# Patient Record
Sex: FEMALE | Race: WHITE | NOT HISPANIC OR LATINO | Employment: PART TIME | URBAN - METROPOLITAN AREA
[De-identification: names, ages, dates, MRNs, and addresses within clinical notes are randomized per-mention and may not be internally consistent; named-entity substitution may affect disease eponyms.]

---

## 2017-01-04 ENCOUNTER — TELEPHONE (OUTPATIENT)
Dept: NEUROSURGERY | Facility: CLINIC | Age: 52
End: 2017-01-04

## 2017-01-04 NOTE — TELEPHONE ENCOUNTER
Provider:  Eleazar  Caller:     Phone #:    Surgery:PLIF L4-5    Surgery Date: 12/29/16   Last visit:   11/23/16    BRITTNI:         Reason for call:       Pt left message stating that she is now experiencing numbness in her left leg since the surgery, was concerned about this, requests call back

## 2017-01-05 DIAGNOSIS — Z98.1 STATUS POST LUMBAR SPINAL FUSION: Primary | ICD-10-CM

## 2017-01-19 ENCOUNTER — HOSPITAL ENCOUNTER (OUTPATIENT)
Dept: GENERAL RADIOLOGY | Facility: HOSPITAL | Age: 52
Discharge: HOME | End: 2017-01-19
Admitting: PHYSICIAN ASSISTANT

## 2017-01-19 DIAGNOSIS — Z98.1 STATUS POST LUMBAR SPINAL FUSION: ICD-10-CM

## 2017-01-19 PROCEDURE — 72100 X-RAY EXAM L-S SPINE 2/3 VWS: CPT

## 2017-01-20 ENCOUNTER — OFFICE VISIT (OUTPATIENT)
Dept: NEUROSURGERY | Facility: CLINIC | Age: 52
End: 2017-01-20

## 2017-01-20 VITALS — WEIGHT: 158 LBS | BODY MASS INDEX: 26.33 KG/M2 | TEMPERATURE: 98.4 F | HEIGHT: 65 IN

## 2017-01-20 DIAGNOSIS — M53.2X6 SPINAL INSTABILITY OF LUMBAR REGION: Primary | ICD-10-CM

## 2017-01-20 DIAGNOSIS — Z98.1 STATUS POST LUMBAR SPINAL FUSION: ICD-10-CM

## 2017-01-20 DIAGNOSIS — M43.10 ACQUIRED SPONDYLOLISTHESIS: ICD-10-CM

## 2017-01-20 PROCEDURE — 99024 POSTOP FOLLOW-UP VISIT: CPT | Performed by: PHYSICIAN ASSISTANT

## 2017-01-20 RX ORDER — ESTRADIOL 0.05 MG/D
FILM, EXTENDED RELEASE TRANSDERMAL
Qty: 24 PATCH | Refills: 0 | Status: SHIPPED | OUTPATIENT
Start: 2017-01-20 | End: 2018-01-31 | Stop reason: SINTOL

## 2017-01-20 NOTE — TELEPHONE ENCOUNTER
Dr. George pt  Patient's pharmacy [facility] faxed over a request for 90 day supply on Estradiol patch 0.05mg. E-Rx #24 no refills on 1/20/2017

## 2017-01-20 NOTE — LETTER
"2017     Ana Johnston MD  [address] [address]    Patient: Echo Lane   YOB: 1965   Date of Visit: 2017       Dear Dr. Preston MD:    Echo Lane was in my office today. Below is a copy of my note.    If you have questions, please do not hesitate to call me. I look forward to following Echo along with you.         Sincerely,        Cherie Shaffer PA-C        CC: No Recipients    Patient: Echo Lane  : 1965  Chart #: [id number]    Date of Service: 2017    CHIEF COMPLAINT: L4 5 transition syndrome with stenosis, spondylolisthesis and instability    History of Present Illness Ms. Lane is a 51-year-old  who underwent with at L5-S1 level in  and did well.  More recently she developed progressive back pain.  Studies revealed new onset of L4 and L5 with scott movement noted on flexion-extension views.  There is also some stenosis.  As such on 2016 she underwent L4 5 fusion extension with posterior lumbar interbody fusion.  Surgery was without overt intraoperative complication.    Today Ms. Lane is 3 weeks postop.  Her back pain has improved dramatically.  She has no leg pain.  She does have a bit of numbness in the left shin.  She has been walking 20 minutes a couple times a day and would like to increase her activities.  She would also like to return to work.  She denies incisional difficulties.  She is no longer taking narcotic-based pain medication.  She takes ibuprofen on occasion.      The following portions of the patient's history were reviewed and updated as appropriate: allergies, current medications, past family history, past medical history, past social history, past surgical history and problem list.    Review of Systems   All other systems reviewed and are negative.      Objective   Vital Signs: Temperature 98.4 °F (36.9 °C), height 65\" (165.1 cm), weight 158 lb " (71.7 kg).  Physical Exam   Constitutional: She appears well-developed and well-nourished. No distress.   HENT:   Head: Normocephalic and atraumatic.   Eyes: EOM are normal. Pupils are equal, round, and reactive to light.   Skin:   Lumbar incision has healed nicely without warmth erythema or induration.   Psychiatric: She has a normal mood and affect. Her behavior is normal. Thought content normal.   Nursing note and vitals reviewed.    Radiographic Imaging: plain films of the lumbar spine were reviewed and reveal good placement of the surgical construct.    Assessment/Plan   Medical Decision Making: Ms. Lane is 3 weeks status post L4 5 fusion extension with PLIF and is doing excellent.  Her incision is healing nicely.  She may return to tub bathing. We discussed some specific exercises she may begin to resume on a graded basis. She will be cognizant of her limitations.   I advised her to avoid high impact workouts for several more weeks.  She may return to work next week at her request. She can do a few half days the first week before returning full-time. I encouraged frequent changes in position.  She'll follow up in 6-8 weeks with Dr. Bush.                Cherie Shaffer PA-C  Patient Care Team:  Ana Johnston MD as PCP - General (Family Medicine)  Ana Johnston MD as Referring Physician (Family Medicine)

## 2017-01-20 NOTE — PROGRESS NOTES
"Patient: Echo Lane  : 1965  Chart #: [id number]    Date of Service: 2017    CHIEF COMPLAINT: L4 5 transition syndrome with stenosis, spondylolisthesis and instability    History of Present Illness Ms. Lane is a 51-year-old  who underwent with at L5-S1 level in  and did well.  More recently she developed progressive back pain.  Studies revealed new onset of L4 and L5 with scott movement noted on flexion-extension views.  There is also some stenosis.  As such on 2016 she underwent L4 5 fusion extension with posterior lumbar interbody fusion.  Surgery was without overt intraoperative complication.    Today Ms. Lane is 3 weeks postop.  Her back pain has improved dramatically.  She has no leg pain.  She does have a bit of numbness in the left shin.  She has been walking 20 minutes a couple times a day and would like to increase her activities.  She would also like to return to work.  She denies incisional difficulties.  She is no longer taking narcotic-based pain medication.  She takes ibuprofen on occasion.      The following portions of the patient's history were reviewed and updated as appropriate: allergies, current medications, past family history, past medical history, past social history, past surgical history and problem list.    Review of Systems   All other systems reviewed and are negative.      Objective   Vital Signs: Temperature 98.4 °F (36.9 °C), height 65\" (165.1 cm), weight 158 lb (71.7 kg).  Physical Exam   Constitutional: She appears well-developed and well-nourished. No distress.   HENT:   Head: Normocephalic and atraumatic.   Eyes: EOM are normal. Pupils are equal, round, and reactive to light.   Skin:   Lumbar incision has healed nicely without warmth erythema or induration.   Psychiatric: She has a normal mood and affect. Her behavior is normal. Thought content normal.   Nursing note and vitals reviewed.    Radiographic Imaging: plain " films of the lumbar spine were reviewed and reveal good placement of the surgical construct.    Assessment/Plan   Medical Decision Making: Ms. Lane is 3 weeks status post L4 5 fusion extension with PLIF and is doing excellent.  Her incision is healing nicely.  She may return to tub bathing. We discussed some specific exercises she may begin to resume on a graded basis. She will be cognizant of her limitations.   I advised her to avoid high impact workouts for several more weeks.  She may return to work next week at her request. She can do a few half days the first week before returning full-time. I encouraged frequent changes in position.  She'll follow up in 6-8 weeks with Dr. Bush.                Cherie Shaffer PA-C  Patient Care Team:  Ana Johnston MD as PCP - General (Family Medicine)  Ana Johnston MD as Referring Physician (Family Medicine)

## 2017-01-20 NOTE — MR AVS SNAPSHOT
Echo Lane   1/20/2017 1:30 PM   Office Visit    Dept Phone:  [phone]   Encounter #:  [id number]    Provider:  Cherie Shaffer PA-C   Department:  [facility]                Your Full Care Plan              Today's Medication Changes          These changes are accurate as of: 1/20/17  2:04 PM.  If you have any questions, ask your nurse or doctor.               Stop taking medication(s)listed here:     oxyCODONE ER 15 MG tablet extended-release 12 hour   Commonly known as:  OXYCONTIN   Stopped by:  Cherie Shaffer PA-C                Where to Get Your Medications      These medications were sent to [facility] - [place], [place] - [address] [place] [address] - [phone]  - [phone] [address] [address]     Phone:  [phone]     estradiol 0.05 MG/24HR patch                  Your Updated Medication List          This list is accurate as of: 1/20/17  2:04 PM.  Always use your most recent med list.                ARMOUR THYROID 30 MG tablet   Generic drug:  Thyroid       atorvastatin 10 MG tablet   Commonly known as:  LIPITOR       estradiol 0.05 MG/24HR patch   Commonly known as:  MINIVELLE   Apply 1 minivelle patch by transdermal route twice weekly       ibuprofen 600 MG tablet   Commonly known as:  ADVIL,MOTRIN   Take 1 tablet by mouth 3 (Three) Times a Day.       progesterone 100 MG capsule   Commonly known as:  PROMETRIUM   Take 1 capsule by mouth every night.               You Were Diagnosed With        Codes Comments    Spinal instability of lumbar region    -  Primary ICD-10-CM: M53.2X6  ICD-9-CM: 724.9     Status post lumbar spinal fusion     ICD-10-CM: Z98.1  ICD-9-CM: V45.4     Acquired spondylolisthesis     ICD-10-CM: M43.10  ICD-9-CM: 738.4       Instructions     None    Patient Instructions History      Upcoming Appointments     Visit Type Date Time Department    POST-OP  "1/20/2017  1:30 PM [facility]    OFFICE VISIT 3/8/2017  9:15 AM [facility]      MyChart Signup     Our records indicate that you have an active [facility] [employer] account.    You can view your After Visit Summary by going to [employer] and logging in with your [employer] username and password.  If you don't have a [employer] username and password but a parent or guardian has access to your record, the parent or guardian should login with their own [employer] username and password and access your record to view the After Visit Summary.    If you have questions, you can email [email or web address] or call [phone] to talk to our [employer] staff.  Remember, [employer] is NOT to be used for urgent needs.  For medical emergencies, dial 911.               Other Info from Your Visit           Your Appointments     Mar 08, 2017  9:15 AM EST   (Arrive by 9:00 AM EST)   Office Visit with Salvador Bush MD   [facility] (--)    [address],  [address] [address] [phone]           Arrive 15 minutes prior to appointment.              Allergies     Bactrim [Sulfamethoxazole-trimethoprim] Allergy Hives      Reason for Visit     Post-op           Vital Signs     Temperature Height Weight Last Menstrual Period Body Mass Index Smoking Status    98.4 °F (36.9 °C) 65\" (165.1 cm) 158 lb (71.7 kg) (LMP Unknown) 26.29 kg/m2 Never Smoker      Problems and Diagnoses Noted     Lumbar spine instability    -  Primary    Status post lumbar spinal fusion        Disorder of spine            "

## 2017-01-23 ENCOUNTER — TELEPHONE (OUTPATIENT)
Dept: NEUROSURGERY | Facility: CLINIC | Age: 52
End: 2017-01-23

## 2017-01-23 DIAGNOSIS — M53.2X6 LUMBAR SPINE INSTABILITY: Primary | ICD-10-CM

## 2017-01-23 NOTE — TELEPHONE ENCOUNTER
Dr. Bush  Sx: 12/29/16 PLIF L4-5 L5-S1  Last seen: 01/20/17  F/U: 03/08/17    Pt sent email requesting info on restrictions and wanting to know if he will follow up with xrays.

## 2017-01-23 NOTE — TELEPHONE ENCOUNTER
----- Message from Echo Lane sent at 1/20/2017 10:21 PM EST -----  Regarding: Visit Follow-Up Question  Contact: [phone]  I've thought if a couple more questions. I was told in the hospital that I can only lift 10 pounds. Is that restriction still the same?  Also, will I need to get an X-ray before my next appointment with Dr. Bush?    Thanks so much!    Echo Lane

## 2017-01-23 NOTE — TELEPHONE ENCOUNTER
Called pt, adv of restrictions. She asked me to mail the PT order to her, verified address. Pt understood and had no further questions.

## 2017-01-23 NOTE — TELEPHONE ENCOUNTER
Patient needs to attend PT. There they will be able to assess her abilities.     From our standpoint no heavy lifting and avoid BLTs.     No additional Xrays needed.

## 2017-02-01 ENCOUNTER — HOSPITAL ENCOUNTER (OUTPATIENT)
Dept: PHYSICAL THERAPY | Facility: HOSPITAL | Age: 52
Setting detail: THERAPIES SERIES
Discharge: HOME | End: 2017-02-01

## 2017-02-01 DIAGNOSIS — M54.50 ACUTE BILATERAL LOW BACK PAIN WITHOUT SCIATICA: Primary | ICD-10-CM

## 2017-02-01 DIAGNOSIS — M53.2X6 LUMBAR SPINE INSTABILITY: ICD-10-CM

## 2017-02-01 PROCEDURE — 97161 PT EVAL LOW COMPLEX 20 MIN: CPT

## 2017-02-01 NOTE — PROGRESS NOTES
Outpatient Physical Therapy Ortho Initial Evaluation   [place]     Patient Name: Echo Lane  : 1965  MRN: [id number]  Today's Date: 2017      Visit Date: 2017    Patient Active Problem List   Diagnosis   • Lumbar spine instability        Past Medical History   Diagnosis Date   • Arthritis    • Back pain    • Disease of thyroid gland    • Eczema    • Hyperlipidemia    • Wears glasses         Past Surgical History   Procedure Laterality Date   • Reduction mammaplasty Bilateral 2004   • Lumbar fusion  2012     PLIF L5-S1 (Eleazar)   • Tonsillectomy     • Lumbar laminectomy with fusion N/A 2016     Procedure: LUMBAR LAMINECTOMY POSTERIOR LUMBAR INTERBODY FUSION L4-5;  Surgeon: Salvador Bush MD;  Location: [facility];  Service:        Visit Dx:     ICD-10-CM ICD-9-CM   1. Acute bilateral low back pain without sciatica M54.5 724.2     338.19             Patient History       17 0800          History    Chief Complaint Pain;Muscle tenderness;Joint stiffness;Difficulty Walking  -GB      Type of Pain Back pain  -GB      Date Current Problem(s) Began 16   pre-op 1-2 years  -GB      Brief Description of Current Complaint Patient has experienced stiffness in her lower back and bilateral hips and lower back.  She experiences problems with walking.  The pain she had from her original condition seems to be gone.  -GB      Patient/Caregiver Goals Return to prior level of function  -GB      Patient's Rating of General Health Excellent  -GB      Occupation/sports/leisure activities Patient is employed as an  and she has retruned to work.  She enjoys walking, and boating.  -GB      Pain     Pain Location Back  -GB      Pain at Present 2  -GB      Pain at Best 0  -GB      Pain at Worst 3  -GB      Fall Risk Assessment    Any falls in the past year: Yes  -GB      Number of falls reported in the last 12 months 1  -GB      Factors that contributed to the fall: Tripped   -GB      Daily Activities    Primary Language English  -GB      Pt Participated in POC and Goals Yes  -GB      Safety    Are you being hurt, hit, or frightened by anyone at home or in your life? No  -GB        User Key  (r) = Recorded By, (t) = Taken By, (c) = Cosigned By    Initials Name Provider Type    GB Deonte Keenan, PT Physical Therapist                PT Ortho       02/01/17 1400    Posture/Observations    Posture/Observations Comments Observed tightness of bilateral lumbar paraspinals;   -GB    Sensory Screen for Light Touch- Lower Quarter Clearing    L2 (anterior mid thigh) Bilateral:;Intact  -GB    L3 (distal anterior thigh) Bilateral:;Intact  -GB    L4 (medial lower leg/foot) Bilateral:;Intact  -GB    L5 (lateral lower leg/great toe) Right:;Intact;Left:;Diminished   At left lateral leg, symmetrical and intact at big toe  -GB    Myotomal Screen- Lower Quarter Clearing    Hip flexion (L2) Bilateral:;4+ (Good +)  -GB    Knee extension (L3) Bilateral:;5 (Normal)  -GB    Ankle DF (L4) Bilateral:;4 (Good)  -GB    Great toe extension (L5) Bilateral:;4+ (Good +)  -GB    Ankle PF (S1) Bilateral:;5 (Normal)  -GB    Knee flexion (S2) Bilateral:;5 (Normal)  -GB    Lumbar ROM Screen- Lower Quarter Clearing    Lumbar Flexion Impaired   50  -GB    Lumbar Extension Impaired   10  -GB    Lumbar Lateral Flexion Normal  -GB    Lumbar Rotation Normal   Patient is hesitant with movement  -GB    SI/Hip Screen- Lower Quarter Clearing    ASIS compression Bilateral:;Negative  -GB    ASIS distraction Bilateral:;Negative  -GB    Dior's/Rebel's test Bilateral:;Positive  -GB    Special Tests/Palpation    Special Tests/Palpation Lumbar/SI  -GB    Lumbosacral Palpation    SI Bilateral:;Tender  -GB    Lumbosacral Segment Left:;Guarded/taut   Over L4,L5  -GB    Erector Spinae (Paraspinals) Left:;Guarded/taut;Tender  -GB    Hip/Thigh Palpation    Greater Trochanter Right:;Tender  -GB    Leg Length Test    Apparent Unequal;Left  Higher Leg  -GB    ROM (Range of Motion)    General ROM Detail LEs are WFL  -GB    MMT (Manual Muscle Testing)    General MMT Assessment Detail Hip ABDuction = 4/5, and Adduction= 4/5, (B)  -GB      User Key  (r) = Recorded By, (t) = Taken By, (c) = Cosigned By    Initials Name Provider Type    AGATA Keenan, PT Physical Therapist                            Therapy Education       02/01/17 1416    Therapy Education    Given Symptoms/condition management   Lumbar education with walking program discussed to <10 min keeping pain free or OK to use elliptical in pain free time frame, limited lifitng to 10% body weight  or 15 lbs  -GB    How Provided Verbal  -GB    Provided to Patient  -GB    Level of Understanding Verbalized  -GB      User Key  (r) = Recorded By, (t) = Taken By, (c) = Cosigned By    Initials Name Provider Type    AGATA Keenan, PT Physical Therapist                PT OP Goals       02/01/17 1400          PT Short Term Goals    STG Date to Achieve 02/15/17  -GB      STG 1 Patient is independent with a HEP for flexibility and initial strengthening.  -GB      STG 1 Progress New  -GB      STG 2 Patient is able to tolerate walking or activity for 20 min without increased pain.  -GB      STG 2 Progress New  -GB      Long Term Goals    LTG Date to Achieve 03/03/17  -GB      LTG 1 Patinet is independent with HEP for stabilization and self mobilization.  -GB      LTG 1 Progress New  -GB      LTG 2 Patient is able to tolerate moderate exercise or acitivty for over 30 min without complaints.  -GB      LTG 2 Progress New  -GB      LTG 3 Modified Oswestry score is improved by at least 10%.  -GB      LTG 3 Progress New  -GB      Time Calculation    PT Goal Re-Cert Due Date 03/03/17  -GB        User Key  (r) = Recorded By, (t) = Taken By, (c) = Cosigned By    Initials Name Provider Type    AGATA Keenan PT Physical Therapist                PT Assessment/Plan       02/01/17 1420          PT  Assessment    Functional Limitations Performance in leisure activities;Limitations in functional capacity and performance;Limitation in home management;Limitations in community activities;Performance in work activities;Performance in self-care ADL  -GB      Impairments Endurance;Range of motion;Posture;Poor body mechanics;Pain;Sensation;Muscle strength;Joint mobility  -GB      Assessment Comments Patient has findings consistent with this stage of her recovery.  She is tight in left lumbar areas and bilateral Gluteal areas, left being slightly worse than right.  She should benefit from therapy with initial emphasis on ther ex and manual techniques.  -GB      Please refer to paper survey for additional self-reported information Yes  -GB      Rehab Potential Good  -GB      Patient/caregiver participated in establishment of treatment plan and goals Yes  -GB      Patient would benefit from skilled therapy intervention Yes  -GB      PT Plan    PT Frequency 1x/week  -GB      Predicted Duration of Therapy Intervention (days/wks) 4 weeks  -GB      Planned CPT's? PT THER PROC EA 15 MIN: 15177;PT MANUAL THERAPY EA 15 MIN: 32878;PT NEUROMUSC RE-EDUCATION EA 15 MIN: 88263;PT ELECTRICAL STIM UNATTEND: ;PT HOT/COLD PACK WC NONMCARE: 19394   93115  -GB      PT Plan Comments Progress with ther ex in clinical setting and with HEP.  Incorporate manual therapy or Astym techniques.  -AGATA        User Key  (r) = Recorded By, (t) = Taken By, (c) = Cosigned By    Initials Name Provider Type    AGATA Keenan, MAURICE Physical Therapist                                    Outcome Measures       02/01/17 1400          Modified Oswestry    Modified Oswestry Score/Comments 20%  -GB      Functional Assessment    Outcome Measure Options Modifed Owestry  -AGATA        User Key  (r) = Recorded By, (t) = Taken By, (c) = Cosigned By    Initials Name Provider Type    AGATA Keenan, MAURICE Physical Therapist            Time Calculation:   Start  Time: 0830     Therapy Charges for Today     Code Description Service Date Service Provider Modifiers Qty    02420746320 HC PT EVAL LOW COMPLEXITY 4 2/1/2017 Deonte Keenan, PT GP 1          PT G-Codes  Outcome Measure Options: Modifed José Miguel Keenan, PT  2/1/2017

## 2017-02-02 ENCOUNTER — APPOINTMENT (OUTPATIENT)
Dept: PHYSICAL THERAPY | Facility: HOSPITAL | Age: 52
End: 2017-02-02

## 2017-02-09 ENCOUNTER — HOSPITAL ENCOUNTER (OUTPATIENT)
Dept: PHYSICAL THERAPY | Facility: HOSPITAL | Age: 52
Setting detail: THERAPIES SERIES
Discharge: HOME | End: 2017-02-09

## 2017-02-09 DIAGNOSIS — M54.50 ACUTE BILATERAL LOW BACK PAIN WITHOUT SCIATICA: Primary | ICD-10-CM

## 2017-02-09 PROCEDURE — 97140 MANUAL THERAPY 1/> REGIONS: CPT

## 2017-02-09 PROCEDURE — 97110 THERAPEUTIC EXERCISES: CPT

## 2017-02-09 NOTE — PROGRESS NOTES
Outpatient Physical Therapy Ortho Treatment Note  [facility]     Patient Name: Echo Lane  : 1965  MRN: [id number]  Today's Date: 2017      Visit Date: 2017    Visit Dx:    ICD-10-CM ICD-9-CM   1. Acute bilateral low back pain without sciatica M54.5 724.2     338.19       Patient Active Problem List   Diagnosis   • Lumbar spine instability        Past Medical History   Diagnosis Date   • Arthritis    • Back pain    • Disease of thyroid gland    • Eczema    • Hyperlipidemia    • Wears glasses         Past Surgical History   Procedure Laterality Date   • Reduction mammaplasty Bilateral    • Lumbar fusion  2012     PLIF L5-S1 (Eleazar)   • Tonsillectomy     • Lumbar laminectomy with fusion N/A 2016     Procedure: LUMBAR LAMINECTOMY POSTERIOR LUMBAR INTERBODY FUSION L4-5;  Surgeon: Salvador Bush MD;  Location: [facility];  Service:                              PT Assessment/Plan       17 1539          PT Assessment    Assessment Comments Patient has significant tightness in lower back, left Glut Medius and left SI.  -GB      PT Plan    PT Plan Comments Follow up with care.  -GB        User Key  (r) = Recorded By, (t) = Taken By, (c) = Cosigned By    Initials Name Provider Type    AGATA Keenan, PT Physical Therapist                    Exercises       17 1100          Subjective Comments    Subjective Comments Patient is without any new complaints.  -GB      Subjective Pain    Able to rate subjective pain? yes  -GB      Pre-Treatment Pain Level 3  -GB      Post-Treatment Pain Level 3  -GB      Exercise 1    Exercise Name 1 Ther ex in clinical setting as per flow sheet  -GB      Time (Minutes) 1 18  -GB      Treatment Type 1 Ther Ex  -GB        User Key  (r) = Recorded By, (t) = Taken By, (c) = Cosigned By    Initials Name Provider Type    AGATA Keenan, PT Physical Therapist                        Manual Rx (last 36 hours)      Manual  Treatments       02/09/17 1500          Manual Rx 1    Manual Rx 1 Location (B) Lumbosacral to Thoracic paraspinals and (B) Gluteal areas  -GB      Manual Rx 1 Type Astym and light STM to paraspinals  -GB      Manual Rx 1 Duration 12  -GB        User Key  (r) = Recorded By, (t) = Taken By, (c) = Cosigned By    Initials Name Provider Type    AGATA Keenan, PT Physical Therapist                PT OP Goals       02/01/17 1400          PT Short Term Goals    STG Date to Achieve 02/15/17  -GB      STG 1 Patient is independent with a HEP for flexibility and initial strengthening.  -GB      STG 1 Progress New  -GB      STG 2 Patient is able to tolerate walking or activity for 20 min without increased pain.  -GB      STG 2 Progress New  -GB      Long Term Goals    LTG Date to Achieve 03/03/17  -GB      LTG 1 Patinet is independent with HEP for stabilization and self mobilization.  -GB      LTG 1 Progress New  -GB      LTG 2 Patient is able to tolerate moderate exercise or acitivty for over 30 min without complaints.  -GB      LTG 2 Progress New  -GB      LTG 3 Modified Oswestry score is improved by at least 10%.  -GB      LTG 3 Progress New  -GB      Time Calculation    PT Goal Re-Cert Due Date 03/03/17  -GB        User Key  (r) = Recorded By, (t) = Taken By, (c) = Cosigned By    Initials Name Provider Type    AGATA Keenan, MAURICE Physical Therapist                Therapy Education       02/09/17 1534    Therapy Education    Given HEP   SKTC, short arc wall squats, hip adduction squeezes, Hip ABDuction SLR, ab presses and leaning planks  -GB    Program Progressed  -GB    How Provided Written;Demonstration;Verbal  -GB    Provided to Patient  -GB    Level of Understanding Teach back education performed;Verbalized;Demonstrated  -GB      User Key  (r) = Recorded By, (t) = Taken By, (c) = Cosigned By    Initials Name Provider Type    AGATA Keenan PT Physical Therapist                Time Calculation:    Start Time: 1133  Total Timed Code Minutes- PT: 35 minute(s)    Therapy Charges for Today     Code Description Service Date Service Provider Modifiers Qty    56770138647 HC PT THER PROC EA 15 MIN 2/9/2017 Deonte Keenan, PT GP 1    76629253813 HC PT MANUAL THERAPY EA 15 MIN 2/9/2017 Deonte Keenan, PT GP 1                    Deonte Keenan, PT  2/9/2017

## 2017-02-15 ENCOUNTER — APPOINTMENT (OUTPATIENT)
Dept: PHYSICAL THERAPY | Facility: HOSPITAL | Age: 52
End: 2017-02-15

## 2017-02-16 ENCOUNTER — APPOINTMENT (OUTPATIENT)
Dept: PHYSICAL THERAPY | Facility: HOSPITAL | Age: 52
End: 2017-02-16

## 2017-02-17 ENCOUNTER — HOSPITAL ENCOUNTER (OUTPATIENT)
Dept: PHYSICAL THERAPY | Facility: HOSPITAL | Age: 52
Setting detail: THERAPIES SERIES
Discharge: HOME | End: 2017-02-17

## 2017-02-17 DIAGNOSIS — M54.50 ACUTE BILATERAL LOW BACK PAIN WITHOUT SCIATICA: Primary | ICD-10-CM

## 2017-02-17 PROCEDURE — 97110 THERAPEUTIC EXERCISES: CPT

## 2017-02-17 NOTE — PROGRESS NOTES
Outpatient Physical Therapy Ortho Treatment Note  [facility]     Patient Name: Echo Lane  : 1965  MRN: [id number]  Today's Date: 2017      Visit Date: 2017    Visit Dx:    ICD-10-CM ICD-9-CM   1. Acute bilateral low back pain without sciatica M54.5 724.2     338.19       Patient Active Problem List   Diagnosis   • Lumbar spine instability        Past Medical History   Diagnosis Date   • Arthritis    • Back pain    • Disease of thyroid gland    • Eczema    • Hyperlipidemia    • Wears glasses         Past Surgical History   Procedure Laterality Date   • Reduction mammaplasty Bilateral    • Lumbar fusion  2012     PLIF L5-S1 (Eleazar)   • Tonsillectomy     • Lumbar laminectomy with fusion N/A 2016     Procedure: LUMBAR LAMINECTOMY POSTERIOR LUMBAR INTERBODY FUSION L4-5;  Surgeon: Salvador Bush MD;  Location: [facility];  Service:                              PT Assessment/Plan       17 1154          PT Assessment    Assessment Comments Patient defers manual techniques today and does well with subtle progressions of HEP.  -GB      PT Plan    PT Plan Comments Progress with ther ex.  -GB        User Key  (r) = Recorded By, (t) = Taken By, (c) = Cosigned By    Initials Name Provider Type     Deonte Keenan, PT Physical Therapist                    Exercises       17 1100          Subjective Comments    Subjective Comments Patient continues to have mid back and hamstring tightness, but she wants to focus solely on exercises today.  -GB      Subjective Pain    Able to rate subjective pain? yes  -GB      Pre-Treatment Pain Level 2  -GB      Post-Treatment Pain Level 1  -GB      Exercise 1    Exercise Name 1 Nustep x 7 min at level 6;  Pilates based exercises:  100, ab presses, oblique presses, LTRs, side hip plank, modified plank, roll outs, bird dogs and cat/camel followed by stretches with sarabjit pose, hasmtring and Piriformis stretches  -GB      Time  (Minutes) 1 30  -GB      Treatment Type 1 Ther Ex  -GB        User Key  (r) = Recorded By, (t) = Taken By, (c) = Cosigned By    Initials Name Provider Type    AGATA Keenan PT Physical Therapist                        Manual Rx (last 36 hours)      Manual Treatments       02/17/17 1100          Manual Rx 1    Manual Rx 1 Location Gluteal to LEs  -GB      Manual Rx 1 Type Foam roller  -GB      Manual Rx 1 Duration 2-3  -GB        User Key  (r) = Recorded By, (t) = Taken By, (c) = Cosigned By    Initials Name Provider Type    AGATA Keenan, PT Physical Therapist                PT OP Goals       02/17/17 1100          PT Short Term Goals    STG 1 Patient is independent with a HEP for flexibility and initial strengthening.  -GB      STG 1 Progress Met  -GB        User Key  (r) = Recorded By, (t) = Taken By, (c) = Cosigned By    Initials Name Provider Type    AGATA Keenan PT Physical Therapist                Therapy Education       02/17/17 1153    Therapy Education    Given Symptoms/condition management   use of roller and change in frequency of exercises  -GB    Program Progressed   ab presses, 100, oblique presses, hip rolls, side hip raises, roll outs, leaning planks, bird dogs and cat/camel  -GB    How Provided Verbal;Demonstration;Written  -GB    Provided to Patient  -GB    Level of Understanding Teach back education performed;Verbalized;Demonstrated  -GB      User Key  (r) = Recorded By, (t) = Taken By, (c) = Cosigned By    Initials Name Provider Type    AGATA Keenan PT Physical Therapist                Time Calculation:   Start Time: 1100  Total Timed Code Minutes- PT: 35 minute(s)    Therapy Charges for Today     Code Description Service Date Service Provider Modifiers Qty    48368973312  PT THER PROC EA 15 MIN 2/17/2017 Deonte Keenan, PT GP 2                    Deonte Keenan PT  2/17/2017

## 2017-02-23 ENCOUNTER — APPOINTMENT (OUTPATIENT)
Dept: PHYSICAL THERAPY | Facility: HOSPITAL | Age: 52
End: 2017-02-23

## 2017-02-24 ENCOUNTER — HOSPITAL ENCOUNTER (OUTPATIENT)
Dept: PHYSICAL THERAPY | Facility: HOSPITAL | Age: 52
Setting detail: THERAPIES SERIES
Discharge: HOME | End: 2017-02-24

## 2017-02-24 DIAGNOSIS — M54.50 ACUTE BILATERAL LOW BACK PAIN WITHOUT SCIATICA: Primary | ICD-10-CM

## 2017-02-24 PROCEDURE — 97110 THERAPEUTIC EXERCISES: CPT

## 2017-02-24 PROCEDURE — 97140 MANUAL THERAPY 1/> REGIONS: CPT

## 2017-02-24 NOTE — PROGRESS NOTES
Outpatient Physical Therapy Ortho Treatment Note  [facility]     Patient Name: Echo Lane  : 1965  MRN: [id number]  Today's Date: 2017      Visit Date: 2017    Visit Dx:    ICD-10-CM ICD-9-CM   1. Acute bilateral low back pain without sciatica M54.5 724.2     338.19       Patient Active Problem List   Diagnosis   • Lumbar spine instability        Past Medical History   Diagnosis Date   • Arthritis    • Back pain    • Disease of thyroid gland    • Eczema    • Hyperlipidemia    • Wears glasses         Past Surgical History   Procedure Laterality Date   • Reduction mammaplasty Bilateral    • Lumbar fusion  2012     PLIF L5-S1 (Eleazar)   • Tonsillectomy     • Lumbar laminectomy with fusion N/A 2016     Procedure: LUMBAR LAMINECTOMY POSTERIOR LUMBAR INTERBODY FUSION L4-5;  Surgeon: Salvador Bush MD;  Location: [facility];  Service:                              PT Assessment/Plan       17 1303       PT Assessment    Assessment Comments Patient does well with ther ex performed, with increased emphasis placed on scapular stabilization and core stabilization.  -GB     PT Plan    PT Plan Comments Reassess and consider other manual techniques or resume Astym.  -GB       User Key  (r) = Recorded By, (t) = Taken By, (c) = Cosigned By    Initials Name Provider Type    AGATA Keenan, PT Physical Therapist                    Exercises       17 1000          Subjective Comments    Subjective Comments Patient has been dealing with a 'cough'.  -GB      Subjective Pain    Able to rate subjective pain? yes  -GB      Pre-Treatment Pain Level 2  -GB      Exercise 1    Exercise Name 1 NuStep x 5 min followed by Roll outs x 10, Seated rows with Nautilus -3 plates x 2 x 10, seated dips, leaning planks with ABDuction and flexion plate slides, leaning push ups with mountain climbers, Pull downs with 4 plates x 10, rocking chairs x 15, and assisted Piriformis and HS  stretches  -GB      Time (Minutes) 1 20  -GB      Treatment Type 1 Ther Ex  -GB        User Key  (r) = Recorded By, (t) = Taken By, (c) = Cosigned By    Initials Name Provider Type    AGATA Keenan PT Physical Therapist                        Manual Rx (last 36 hours)      Manual Treatments       02/24/17 1300          Manual Rx 1    Manual Rx 1 Location Pelvis  -GB      Manual Rx 1 Type Ham/Quad technique for left posterior rotation of Ilium, followed by Isometric hip adduction squeeze x 4 each  -GB      Manual Rx 1 Duration 3  -GB      Manual Rx 2    Manual Rx 2 Location Lower extremities  -GB      Manual Rx 2 Type Long axis distraction, (R) = 1-2 min, (L) = 4-5 min  -GB        User Key  (r) = Recorded By, (t) = Taken By, (c) = Cosigned By    Initials Name Provider Type    AGATA Keenan PT Physical Therapist                    Therapy Education       02/24/17 1302          Therapy Education    Given --   Blue t-band issued for rows and pull downs at home  -GB      Program Progressed  -GB      How Provided Verbal  -GB      Provided to Patient  -GB      Level of Understanding Verbalized;Demonstrated  -GB        User Key  (r) = Recorded By, (t) = Taken By, (c) = Cosigned By    Initials Name Provider Type    AGATA Keenan PT Physical Therapist                Time Calculation:   Start Time: 1030  Total Timed Code Minutes- PT: 35 minute(s)    Therapy Charges for Today     Code Description Service Date Service Provider Modifiers Qty    71126637737 HC PT THER PROC EA 15 MIN 2/24/2017 Deonte Keenan, PT GP 1    22153882283 HC PT MANUAL THERAPY EA 15 MIN 2/24/2017 Deonte Keenan PT GP 1                    Deonte Keenan PT  2/24/2017

## 2017-03-03 ENCOUNTER — HOSPITAL ENCOUNTER (OUTPATIENT)
Dept: PHYSICAL THERAPY | Facility: HOSPITAL | Age: 52
Setting detail: THERAPIES SERIES
Discharge: HOME | End: 2017-03-03

## 2017-03-03 DIAGNOSIS — M54.50 ACUTE BILATERAL LOW BACK PAIN WITHOUT SCIATICA: Primary | ICD-10-CM

## 2017-03-03 DIAGNOSIS — M53.2X6 LUMBAR SPINE INSTABILITY: ICD-10-CM

## 2017-03-03 PROCEDURE — 97110 THERAPEUTIC EXERCISES: CPT

## 2017-03-03 NOTE — PROGRESS NOTES
Outpatient Physical Therapy Ortho Re-Assessment   [place]     Patient Name: Echo Lane  : 1965  MRN: [id number]  Today's Date: 3/3/2017      Visit Date: 2017    Patient Active Problem List   Diagnosis   • Lumbar spine instability        Past Medical History   Diagnosis Date   • Arthritis    • Back pain    • Disease of thyroid gland    • Eczema    • Hyperlipidemia    • Wears glasses         Past Surgical History   Procedure Laterality Date   • Reduction mammaplasty Bilateral    • Lumbar fusion  2012     PLIF L5-S1 (Eleazar)   • Tonsillectomy     • Lumbar laminectomy with fusion N/A 2016     Procedure: LUMBAR LAMINECTOMY POSTERIOR LUMBAR INTERBODY FUSION L4-5;  Surgeon: Salvador Bush MD;  Location: [facility];  Service:        Visit Dx:     ICD-10-CM ICD-9-CM   1. Acute bilateral low back pain without sciatica M54.5 724.2     338.19   2. Lumbar spine instability M53.2X6 724.9                 PT Ortho       17 1100    Subjective Comments    Subjective Comments Patient feels that she is at least 30% improved since starting ttherapy.  -GB    Subjective Pain    Able to rate subjective pain? yes  -GB    Pre-Treatment Pain Level 2  -GB    Post-Treatment Pain Level 2  -GB    Myotomal Screen- Lower Quarter Clearing    Hip flexion (L2) Right:;5 (Normal);Left:;4+ (Good +)  -GB    Knee extension (L3) Bilateral:;5 (Normal)  -GB    Ankle DF (L4) Bilateral:;4+ (Good +)  -GB    Great toe extension (L5) Bilateral:;4+ (Good +)  -GB    Ankle PF (S1) Bilateral:;5 (Normal)  -GB    Knee flexion (S2) Bilateral:;5 (Normal)  -GB    Lumbar ROM Screen- Lower Quarter Clearing    Lumbar Flexion Normal  -GB    Lumbar Extension Impaired   20  -GB    Lumbar Lateral Flexion Normal  -GB    Lumbar Rotation Normal  -GB    SI/Hip Screen- Lower Quarter Clearing    ASIS compression Bilateral:;Negative  -GB    Lumbosacral Palpation    SI --   No tenderness  -GB    Lumbosacral Segment  Right:;Guarded/taut   over right L5 paravertebrals  -GB    MMT (Manual Muscle Testing)    General MMT Assessment Detail (B) Hip Adduction/ABDuction = 5/5  -GB      User Key  (r) = Recorded By, (t) = Taken By, (c) = Cosigned By    Initials Name Provider Type    AGATA Keenan PT Physical Therapist                            Therapy Education       03/03/17 1257          Therapy Education    Given HEP;Symptoms/condition management   Side planks with 'cary and tucks' and 'leg raises'; t-band with high step pause, side step, and skate walk, without bands sneaky lunges and goose step  -GB      Program New  -GB      How Provided Verbal  -GB      Provided to Patient  -GB      Level of Understanding Verbalized  -GB        User Key  (r) = Recorded By, (t) = Taken By, (c) = Cosigned By    Initials Name Provider Type    AGATA Keenan, PT Physical Therapist                PT OP Goals       03/03/17 1300       PT Short Term Goals    STG Date to Achieve 02/15/17  -GB     STG 1 Patient is independent with a HEP for flexibility and initial strengthening.  -GB     STG 1 Progress Met  -GB     STG 2 Patient is able to tolerate walking or activity for 20 min without increased pain.  -GB     STG 2 Progress Met  -GB     Long Term Goals    LTG Date to Achieve 03/03/17  -GB     LTG 1 Patinet is independent with HEP for stabilization and self mobilization.  -GB     LTG 1 Progress Partially Met  -GB     LTG 2 Patient is able to tolerate moderate exercise or acitivty for over 30 min without complaints.  -GB     LTG 2 Progress Progressing;Partially Met  -GB     LTG 3 Modified Oswestry score is improved by at least 10%.  -GB     LTG 3 Progress Met  -GB     Time Calculation    PT Goal Re-Cert Due Date 03/31/17  -GB       User Key  (r) = Recorded By, (t) = Taken By, (c) = Cosigned By    Initials Name Provider Type    AGATA Keenan PT Physical Therapist                PT Assessment/Plan       03/03/17 1302       PT  Assessment    Functional Limitations Performance in leisure activities;Limitations in functional capacity and performance;Limitation in home management;Limitations in community activities;Performance in work activities;Performance in self-care ADL  -GB     Impairments Endurance;Range of motion;Posture;Poor body mechanics;Pain;Sensation;Muscle strength;Joint mobility  -GB     Assessment Comments Patient has progressed with strength but still needs work on endurance and core stability.  Additional manual therapy for pain control may also be beenficial.  -GB     Please refer to paper survey for additional self-reported information Yes  -GB     Rehab Potential Good  -GB     Patient/caregiver participated in establishment of treatment plan and goals Yes  -GB     Patient would benefit from skilled therapy intervention Yes  -GB     PT Plan    PT Frequency 1x/week  -GB     Predicted Duration of Therapy Intervention (days/wks) 4 weeks  -GB     Planned CPT's? PT NEUROMUSC RE-EDUCATION EA 15 MIN: 83587;PT MANUAL THERAPY EA 15 MIN: 92548;PT THER PROC EA 15 MIN: 39340;PT ELECTRICAL STIM UNATTEND:   -GB     PT Plan Comments Follow up with care.  Consider taping as option for lumbar paravertebrals.  -GB       User Key  (r) = Recorded By, (t) = Taken By, (c) = Cosigned By    Initials Name Provider Type    GB Deonte Keenan, PT Physical Therapist                  Exercises       03/03/17 1100          Subjective Comments    Subjective Comments Patient feels that she is at least 30% improved since starting ttherapy.  -GB      Subjective Pain    Able to rate subjective pain? yes  -GB      Pre-Treatment Pain Level 2  -GB      Post-Treatment Pain Level 2  -GB      Exercise 1    Exercise Name 1 NuStep x 7 min followed by skate walk, high step and side step, sneaky lunges, and goose step with 30' each followed by side plank leg raises and reach and tucks, and use of gym ball for dig and lifts  -GB      Time (Minutes) 1 20  -GB       Treatment Type 1 Ther Ex  -GB        User Key  (r) = Recorded By, (t) = Taken By, (c) = Cosigned By    Initials Name Provider Type    AGATA Keenan, PT Physical Therapist           Manual Rx (last 36 hours)      Manual Treatments       03/03/17 1100          Manual Rx 2    Manual Rx 2 Location Lower extremities  -GB      Manual Rx 2 Type Distraction  -GB      Manual Rx 2 Duration 2 min each  -GB        User Key  (r) = Recorded By, (t) = Taken By, (c) = Cosigned By    Initials Name Provider Type    AGATA Keenan, PT Physical Therapist                            Outcome Measures       03/03/17 1200          Modified Oswestry    Modified Oswestry Score/Comments 6%  -GB      Functional Assessment    Outcome Measure Options Modifed Owestry  -AGATA        User Key  (r) = Recorded By, (t) = Taken By, (c) = Cosigned By    Initials Name Provider Type    AGATA Keenan, PT Physical Therapist            Time Calculation:   Start Time: 1015  Total Timed Code Minutes- PT: 40 minute(s)     Therapy Charges for Today     Code Description Service Date Service Provider Modifiers Qty    98734215898 HC PT THER PROC EA 15 MIN 3/3/2017 Deonte Keenan, PT GP 3          PT G-Codes  Outcome Measure Options: Modifed Owestry         Deonte Keenan, PT  3/3/2017

## 2017-03-08 ENCOUNTER — OFFICE VISIT (OUTPATIENT)
Dept: NEUROSURGERY | Facility: CLINIC | Age: 52
End: 2017-03-08

## 2017-03-08 VITALS — HEIGHT: 65 IN | WEIGHT: 157 LBS | TEMPERATURE: 97.3 F | BODY MASS INDEX: 26.16 KG/M2

## 2017-03-08 DIAGNOSIS — M47.816 FACET ARTHROPATHY, LUMBAR: ICD-10-CM

## 2017-03-08 DIAGNOSIS — M43.10 ACQUIRED SPONDYLOLISTHESIS: ICD-10-CM

## 2017-03-08 DIAGNOSIS — Z98.1 STATUS POST LUMBAR SPINAL FUSION: Primary | ICD-10-CM

## 2017-03-08 DIAGNOSIS — M53.2X6 SPINAL INSTABILITY OF LUMBAR REGION: ICD-10-CM

## 2017-03-08 PROCEDURE — 99024 POSTOP FOLLOW-UP VISIT: CPT | Performed by: NEUROLOGICAL SURGERY

## 2017-03-08 NOTE — PROGRESS NOTES
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    History of Present Illness: Ms. Lane is seen in follow-up.  She is a 51-year-old woman well known to my service.  In  she underwent L5-S1 fusion and did quite well.  However, she developed progressive back pain and was noted to have a transition syndrome at L4-5.  Lior instability was identified with some stenosis.  On 2016 she underwent L4-5 fusion extension and additional decompression.  She has done great.  She's had minimal symptoms.  She has been doing some therapy for some bursitis that involved her hips early on after surgery.  She's been very active.  She is back to work.  She is pleased with her progress.    Review of Systems   Constitutional: Negative for activity change, appetite change, chills, diaphoresis, fatigue, fever and unexpected weight change.   HENT: Negative for congestion, dental problem, drooling, ear discharge, ear pain, facial swelling, hearing loss, mouth sores, nosebleeds, postnasal drip, rhinorrhea, sinus pressure, sneezing, sore throat, tinnitus, trouble swallowing and voice change.    Eyes: Negative for photophobia, pain, discharge, redness, itching and visual disturbance.   Respiratory: Negative for apnea, cough, choking, chest tightness, shortness of breath, wheezing and stridor.    Cardiovascular: Negative for chest pain, palpitations and leg swelling.   Gastrointestinal: Negative for abdominal distention, abdominal pain, anal bleeding, blood in stool, constipation, diarrhea, nausea, rectal pain and vomiting.   Endocrine: Negative for cold intolerance, heat intolerance, polydipsia, polyphagia and polyuria.   Genitourinary: Negative for decreased urine volume, difficulty urinating, dysuria, enuresis, flank pain, frequency, genital sores, hematuria and urgency.  "  Musculoskeletal: Negative for arthralgias, back pain, gait problem, joint swelling, myalgias, neck pain and neck stiffness.   Skin: Negative for color change, pallor, rash and wound.   Allergic/Immunologic: Negative for environmental allergies, food allergies and immunocompromised state.   Neurological: Negative for dizziness, tremors, seizures, syncope, facial asymmetry, speech difficulty, weakness, light-headedness, numbness and headaches.   Hematological: Negative for adenopathy. Does not bruise/bleed easily.   Psychiatric/Behavioral: Negative for agitation, behavioral problems, confusion, decreased concentration, dysphoric mood, hallucinations, self-injury, sleep disturbance and suicidal ideas. The patient is not nervous/anxious and is not hyperactive.        The patient's past medical history, past surgical history, family history, and social history have been reviewed at length in the electronic medical record.    Physical Exam:   Visit Vitals   • Ht 65\" (165.1 cm)   • LMP  (LMP Unknown)     Her incision is well-healed.  She ambulates in a very spry fashion.    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    Treatment Options:   Ms. Lane is doing great.  She will steadily increase her activity.  I will see her in follow-up in 3 months with new plain films of the lumbar spine.       Diagnosis Plan   1. Status post lumbar spinal fusion     2. Acquired spondylolisthesis     3. Spinal instability of lumbar region     4. Facet arthropathy, lumbar       Scribed for Salvador Bush MD by Leanne Jimenez CMA on 03/08/2017 at 9:21 AM    I, Dr. Bush, personally performed the services described in the documentation, as scribed in my presence, and it is both accurate and complete.  "

## 2017-03-10 ENCOUNTER — HOSPITAL ENCOUNTER (OUTPATIENT)
Dept: PHYSICAL THERAPY | Facility: HOSPITAL | Age: 52
Setting detail: THERAPIES SERIES
Discharge: HOME | End: 2017-03-10

## 2017-03-10 DIAGNOSIS — M54.50 ACUTE BILATERAL LOW BACK PAIN WITHOUT SCIATICA: Primary | ICD-10-CM

## 2017-03-10 PROCEDURE — 97110 THERAPEUTIC EXERCISES: CPT

## 2017-03-10 PROCEDURE — 97140 MANUAL THERAPY 1/> REGIONS: CPT

## 2017-03-10 NOTE — PROGRESS NOTES
Outpatient Physical Therapy Ortho Treatment Note  [facility]     Patient Name: Echo Lane  : 1965  MRN: [id number]  Today's Date: 3/10/2017      Visit Date: 03/10/2017    Visit Dx:    ICD-10-CM ICD-9-CM   1. Acute bilateral low back pain without sciatica M54.5 724.2     338.19       Patient Active Problem List   Diagnosis   • Lumbar spine instability        Past Medical History   Diagnosis Date   • Arthritis    • Back pain    • Disease of thyroid gland    • Eczema    • Hyperlipidemia    • Wears glasses         Past Surgical History   Procedure Laterality Date   • Reduction mammaplasty Bilateral    • Lumbar fusion  2012     PLIF L5-S1 (Eleazar)   • Tonsillectomy     • Lumbar laminectomy with fusion N/A 2016     Procedure: LUMBAR LAMINECTOMY POSTERIOR LUMBAR INTERBODY FUSION L4-5;  Surgeon: Salvador Bush MD;  Location: [facility];  Service:                              PT Assessment/Plan       03/10/17 1253       PT Assessment    Assessment Comments Patient continues to progress toward all goals.  She is without pain for first time since starting this series.  -GB     PT Plan    PT Plan Comments Asess benefits of taping.  -GB       User Key  (r) = Recorded By, (t) = Taken By, (c) = Cosigned By    Initials Name Provider Type    GB Deonte Keenan, PT Physical Therapist                    Exercises       03/10/17 1200          Subjective Comments    Subjective Comments Patient presents without pain today.  -GB      Subjective Pain    Able to rate subjective pain? yes  -GB      Pre-Treatment Pain Level 0  -GB      Post-Treatment Pain Level 0  -GB      Exercise 1    Exercise Name 1 NuStep at level 6  x 7 min, followed by boats, planks and side planks;  leg presses with 6 plates x 3 x 10;  assisted SKTC, hamstirng stretch  -GB      Time (Minutes) 1 20  -GB      Treatment Type 1 Ther Ex  -GB        User Key  (r) = Recorded By, (t) = Taken By, (c) = Cosigned By    Initials Name  Provider Type    AGATA Keenan PT Physical Therapist                        Manual Rx (last 36 hours)      Manual Treatments       03/10/17 1100          Manual Rx 1    Manual Rx 1 Location Pelvis  -GB      Manual Rx 1 Type Ham/Quad technique for left posterior rotation of Ilium, followed by Isometric hip adduction squeeze x 4 each  -GB      Manual Rx 1 Duration 2  -GB      Manual Rx 2    Manual Rx 2 Location Lower extremities  -GB      Manual Rx 2 Type Distraction  -GB      Manual Rx 2 Duration 2 min each  -GB      Manual Rx 3    Manual Rx 3 Location (B) Lumbar paravertebrals  -GB      Manual Rx 3 Type KT:  D-P on right, and P-D on left  -GB      Manual Rx 3 Duration 2  -GB        User Key  (r) = Recorded By, (t) = Taken By, (c) = Cosigned By    Initials Name Provider Type    AGATA Keenan PT Physical Therapist                    Therapy Education       03/10/17 1251          Therapy Education    Given HEP   Review of Pilates course patient to participate in; review of roll outs, dig and lifts and Gosse step walking;  -GB      Program Progressed  -AGATA      How Provided Verbal  -GB      Provided to Patient  -GB      Level of Understanding Teach back education performed;Verbalized;Demonstrated  -GB        User Key  (r) = Recorded By, (t) = Taken By, (c) = Cosigned By    Initials Name Provider Type    AGATA Keenan PT Physical Therapist                Time Calculation:   Start Time: 1015  Total Timed Code Minutes- PT: 28 minute(s)    Therapy Charges for Today     Code Description Service Date Service Provider Modifiers Qty    39284239156  PT MANUAL THERAPY EA 15 MIN 3/10/2017 Deonte Keenan, PT GP 1    67785794766 HC PT THER PROC EA 15 MIN 3/10/2017 Deonte Keenan, PT GP 1                    Deonte Keenan, PT  3/10/2017

## 2017-03-24 ENCOUNTER — HOSPITAL ENCOUNTER (OUTPATIENT)
Dept: PHYSICAL THERAPY | Facility: HOSPITAL | Age: 52
Setting detail: THERAPIES SERIES
Discharge: HOME | End: 2017-03-24

## 2017-03-24 DIAGNOSIS — M53.2X6 LUMBAR SPINE INSTABILITY: ICD-10-CM

## 2017-03-24 DIAGNOSIS — M54.50 ACUTE BILATERAL LOW BACK PAIN WITHOUT SCIATICA: Primary | ICD-10-CM

## 2017-03-24 PROCEDURE — 97110 THERAPEUTIC EXERCISES: CPT

## 2017-03-24 NOTE — PROGRESS NOTES
Outpatient Physical Therapy Ortho Treatment Note  [facility]     Patient Name: Echo Lane  : 1965  MRN: [id number]  Today's Date: 3/24/2017      Visit Date: 2017    Visit Dx:    ICD-10-CM ICD-9-CM   1. Acute bilateral low back pain without sciatica M54.5 724.2     338.19   2. Lumbar spine instability M53.2X6 724.9       Patient Active Problem List   Diagnosis   • Lumbar spine instability        Past Medical History:   Diagnosis Date   • Arthritis    • Back pain    • Disease of thyroid gland    • Eczema    • Hyperlipidemia    • Wears glasses         Past Surgical History:   Procedure Laterality Date   • LUMBAR FUSION  2012    PLIF L5-S1 ([facility])   • LUMBAR LAMINECTOMY WITH FUSION N/A 2016    Procedure: LUMBAR LAMINECTOMY POSTERIOR LUMBAR INTERBODY FUSION L4-5;  Surgeon: Salvador Bush MD;  Location: [facility];  Service:    • REDUCTION MAMMAPLASTY Bilateral    • TONSILLECTOMY                               PT Assessment/Plan       17 1248       PT Assessment    Assessment Comments Patient has been doing a Pilates class and staying compliant with HEP.  She feels improvements with posture and overall core stability within the past 2 weeks.  She did not see much difference with taping.  She aggravated her shoulder with exerices, and those symptoms would indicate Deltoid strain and shoulder bursitis.  -GB     PT Plan    PT Plan Comments Finalize and amend HEP.  -GB       User Key  (r) = Recorded By, (t) = Taken By, (c) = Cosigned By    Initials Name Provider Type    AGATA Keenan, PT Physical Therapist                    Exercises       17 1200          Exercise 1    Exercise Name 1 Elliptical  -GB      Time (Minutes) 1 5  -GB      Exercise 2    Exercise Name 2 Ther ex performed:  planks, side planks, dig and lifts, roll outs, Mermaids, bridges, 1/2 bridges with gym ball;  push up planks, rollouts with medicine ball, modified planks with hip slides and  abduction slides, side hip raises with reach and tucks and hip drops  -GB      Time (Minutes) 2 25  -GB        User Key  (r) = Recorded By, (t) = Taken By, (c) = Cosigned By    Initials Name Provider Type    AGATA Keenan PT Physical Therapist                                   Therapy Education       03/24/17 1247          Therapy Education    Given HEP   exercises reviewed as per ther ex flow sheet  -GB      Program Progressed  -GB      How Provided Verbal;Written;Demonstration  -GB      Provided to Patient  -GB      Level of Understanding Verbalized;Demonstrated  -GB        User Key  (r) = Recorded By, (t) = Taken By, (c) = Cosigned By    Initials Name Provider Type    AGATA Keenan PT Physical Therapist                Time Calculation:   Start Time: 1100  Total Timed Code Minutes- PT: 35 minute(s)    Therapy Charges for Today     Code Description Service Date Service Provider Modifiers Qty    [id number]  PT THER PROC EA 15 MIN 3/24/2017 Deonte Keenan, PT GP 2                    Deonte Keenan PT  3/24/2017

## 2017-04-21 ENCOUNTER — DOCUMENTATION (OUTPATIENT)
Dept: PHYSICAL THERAPY | Facility: HOSPITAL | Age: 52
End: 2017-04-21

## 2017-04-21 DIAGNOSIS — M54.50 ACUTE BILATERAL LOW BACK PAIN WITHOUT SCIATICA: Primary | ICD-10-CM

## 2017-12-14 ENCOUNTER — TRANSCRIBE ORDERS (OUTPATIENT)
Dept: ADMINISTRATIVE | Facility: HOSPITAL | Age: 52
End: 2017-12-14

## 2017-12-14 DIAGNOSIS — Z12.31 VISIT FOR SCREENING MAMMOGRAM: Primary | ICD-10-CM

## 2017-12-15 ENCOUNTER — OFFICE VISIT (OUTPATIENT)
Dept: NEUROSURGERY | Facility: CLINIC | Age: 52
End: 2017-12-15

## 2017-12-15 ENCOUNTER — HOSPITAL ENCOUNTER (OUTPATIENT)
Dept: GENERAL RADIOLOGY | Facility: HOSPITAL | Age: 52
Discharge: HOME | End: 2017-12-15
Attending: NEUROLOGICAL SURGERY | Admitting: NEUROLOGICAL SURGERY

## 2017-12-15 VITALS — BODY MASS INDEX: 24.49 KG/M2 | WEIGHT: 147 LBS | HEIGHT: 65 IN | TEMPERATURE: 97.7 F

## 2017-12-15 DIAGNOSIS — M47.816 FACET ARTHROPATHY, LUMBAR: ICD-10-CM

## 2017-12-15 DIAGNOSIS — Z98.1 STATUS POST LUMBAR SPINAL FUSION: ICD-10-CM

## 2017-12-15 DIAGNOSIS — Z98.1 HISTORY OF LUMBAR SPINAL FUSION: Primary | ICD-10-CM

## 2017-12-15 DIAGNOSIS — M43.10 ACQUIRED SPONDYLOLISTHESIS: ICD-10-CM

## 2017-12-15 DIAGNOSIS — M53.2X6 SPINAL INSTABILITY OF LUMBAR REGION: ICD-10-CM

## 2017-12-15 PROCEDURE — 99213 OFFICE O/P EST LOW 20 MIN: CPT | Performed by: NEUROLOGICAL SURGERY

## 2017-12-15 PROCEDURE — 72100 X-RAY EXAM L-S SPINE 2/3 VWS: CPT

## 2017-12-15 NOTE — PROGRESS NOTES
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    History of Present Illness: Ms. Lane is seen in follow up.  She is a 52-year-old  who is known to my service.  In  she underwent L5-S1 fusion and did quite well.  She developed progressive back pain and was noted to have a transition syndrome at L4-5 with scott instability.  On 16 she underwent fusion extension to the L4-5 level.  She has done great.  She has lost some weight.  She is regularly involved in a program of [employer].    Review of Systems   Constitutional: Negative for activity change, appetite change, chills, diaphoresis, fatigue, fever and unexpected weight change.   HENT: Negative for congestion, dental problem, drooling, ear discharge, ear pain, facial swelling, hearing loss, mouth sores, nosebleeds, postnasal drip, rhinorrhea, sinus pressure, sneezing, sore throat, tinnitus, trouble swallowing and voice change.    Eyes: Negative for photophobia, pain, discharge, redness, itching and visual disturbance.   Respiratory: Negative for apnea, cough, choking, chest tightness, shortness of breath, wheezing and stridor.    Cardiovascular: Negative for chest pain, palpitations and leg swelling.   Gastrointestinal: Negative for abdominal distention, abdominal pain, anal bleeding, blood in stool, constipation, diarrhea, nausea, rectal pain and vomiting.   Endocrine: Negative for cold intolerance, heat intolerance, polydipsia, polyphagia and polyuria.   Genitourinary: Negative for decreased urine volume, difficulty urinating, dysuria, enuresis, flank pain, frequency, genital sores, hematuria and urgency.   Musculoskeletal: Negative for arthralgias, back pain, gait problem, joint swelling, myalgias, neck pain and neck stiffness.   Skin: Negative for color change, pallor,  "rash and wound.   Allergic/Immunologic: Negative for environmental allergies, food allergies and immunocompromised state.   Neurological: Negative for dizziness, tremors, seizures, syncope, facial asymmetry, speech difficulty, weakness, light-headedness, numbness and headaches.   Hematological: Negative for adenopathy. Does not bruise/bleed easily.   Psychiatric/Behavioral: Negative for agitation, behavioral problems, confusion, decreased concentration, dysphoric mood, hallucinations, self-injury, sleep disturbance and suicidal ideas. The patient is not nervous/anxious and is not hyperactive.        The patient's past medical history, past surgical history, family history, and social history have been reviewed at length in the electronic medical record.    Physical Exam:   Temp 97.7 °F (36.5 °C) (Temporal Artery )   Ht 165.1 cm (65\")  Wt 66.7 kg (147 lb)  LMP  (LMP Unknown)  BMI 24.46 kg/m2  MUSCULOSKELETAL:  Straight leg raising is negative.  Rebel's Sign is negative.  ROM in back is normal.  Tenderness in the back to palpation is not observed.  NEUROLOGICAL:  Strength is intact in the lower extremities to direct testing.  Muscle tone is normal throughout.  Station and gait are normal.  Sensation is intact to light touch testing throughout.  Deep tendon reflexes are 1+ and symmetrical.  Coordination is intact.      Medical Decision Making    Data Review:   Films reveal good positioning of her construct from L4-S1.  There is more interbody fusion mass at L5-S1.  There is some modest fusion mass forming interbody at L4-5.  There is a slight retrolisthesis of L3 on L4.    Diagnosis:   Lumbar stenosis and instability status post fusion extension to L4-5.    Treatment Options:   Ms. Lane is doing great.  At this juncture she will follow-up in my clinic on an as-needed basis.       Diagnosis Plan   1. History of lumbar spinal fusion     2. Acquired spondylolisthesis     3. Spinal instability of lumbar region   "   4. Facet arthropathy, lumbar         Scribed for Salvador Bush MD by Kenya Finney CMA on 12/15/2017 at 4:30 PM    I, Dr. Bush, personally performed the services described in the documentation, as scribed in my presence, and it is both accurate and complete.

## 2018-01-31 ENCOUNTER — OFFICE VISIT (OUTPATIENT)
Dept: OBSTETRICS AND GYNECOLOGY | Facility: CLINIC | Age: 53
End: 2018-01-31

## 2018-01-31 VITALS
BODY MASS INDEX: 24.49 KG/M2 | WEIGHT: 147 LBS | HEIGHT: 65 IN | DIASTOLIC BLOOD PRESSURE: 60 MMHG | SYSTOLIC BLOOD PRESSURE: 112 MMHG

## 2018-01-31 DIAGNOSIS — Z01.419 WOMEN'S ANNUAL ROUTINE GYNECOLOGICAL EXAMINATION: ICD-10-CM

## 2018-01-31 DIAGNOSIS — N39.3 SUI (STRESS URINARY INCONTINENCE, FEMALE): ICD-10-CM

## 2018-01-31 DIAGNOSIS — Z01.419 WELL WOMAN EXAM WITH ROUTINE GYNECOLOGICAL EXAM: Primary | ICD-10-CM

## 2018-01-31 PROBLEM — Z98.890 S/P BILATERAL BREAST REDUCTION: Status: ACTIVE | Noted: 2018-01-31

## 2018-01-31 PROBLEM — Z98.890 S/P ENDOMETRIAL ABLATION: Status: ACTIVE | Noted: 2018-01-31

## 2018-01-31 PROCEDURE — 99396 PREV VISIT EST AGE 40-64: CPT | Performed by: OBSTETRICS & GYNECOLOGY

## 2018-01-31 PROCEDURE — 82272 OCCULT BLD FECES 1-3 TESTS: CPT | Performed by: OBSTETRICS & GYNECOLOGY

## 2018-01-31 NOTE — PROGRESS NOTES
"Subjective   Chief Complaint   Patient presents with   • Annual Exam     Echo Lane is a 52 y.o. year old  menopausal female presenting to be seen for her annual exam.  There has not been vaginal bleeding in the last 12 months.  Hot flashes and night sweats are not a significant problem.    She has noticed some skin changes or face on the Minivelle.  Wonders if a cream or gel would work better.  Told her that all hormones may potentially have same effect on her complexion but that we can give her a sample of Estrogel.  She will need to continue the Prometrium.  Her daughter-in-law Shelbie Lane is now labor and delivery nurse starting soon.  I delivered her     SEXUAL Hx:  She is sexually active.  Vaginal dryness is not a problem.    HEALTH Hx:  She exercises regularly: yes.  She wears her seat belt:yes.  She has concerns about domestic violence: no.  She has noticed changes in height: no.              Calcium intake is adequate    The following portions of the patient's history were reviewed and updated as appropriate:problem list, current medications, allergies, past family history, past medical history, past social history and past surgical history.    Smoking status: Never Smoker                                                              Smokeless status: Never Used                        Review of Systems   Her bowels and bladder are normal     Objective   /60  Ht 163.8 cm (64.5\")  Wt 66.7 kg (147 lb)  LMP  (LMP Unknown)  BMI 24.84 kg/m2     General:  well developed; well nourished  no acute distress  appears stated age   Skin:  No suspicious lesions seen   Thyroid: not examined   Breasts:  Examined in supine position  Symmetric without masses or skin dimpling  Nipples normal without inversion, lesions or discharge  There are no palpable axillary nodes  Bilateral breast reduction scars are noted   Abdomen: soft, non-tender; no masses  no umbilical or inginual hernias are " present  no hepato-splenomegaly   Pelvis: Clinical staff was present for exam  External genitalia:  normal appearance of the external genitalia including Bartholin's and [place]'s glands.  :  urethral meatus normal; urethra hypermobile;  Vaginal:  normal pink mucosa without prolapse or lesions. she is able to perform a Kegel contraction upon request;  Uterus:  normal size, shape and consistency.  Adnexa:  non palpable bilaterally.  Rectal:  anus visually normal appearing. recto-vaginal exam unremarkable and confirms findings; good sphincter tone; no masses; guaiac negative;        Assessment   1. Normal postmenopausal GYN exam with rare stress incontinence good Kegel response.  Discussed doing these for 5 minutes daily and certainly as needed  2. Will change Minivelle to Estrogel and see if this changes complexion issues at all.we'll give her a sample of the Estrogel     Plan   1. Calcium discussed  1200 mg daily in divided doses ideally in diet  2. Regular weight bearing exercise  3. Breast self awareness, mammograms discussed  4. Annual or when necessary    New Medications Ordered This Visit   Medications   • Estradiol (ESTROGEL) 0.75 MG/1.25 GM (0.06%) topical gel     Sig: Place 1.25 g on the skin Daily.     Dispense:  3 bottle     Refill:  3   • progesterone (PROMETRIUM) 100 MG capsule     Sig: Take 1 capsule by mouth Every Night.     Dispense:  90 capsule     Refill:  3           This note was electronically signed.    Ifeanyi George M.D.  January 31, 2018

## 2018-03-09 ENCOUNTER — HOSPITAL ENCOUNTER (OUTPATIENT)
Dept: MAMMOGRAPHY | Facility: HOSPITAL | Age: 53
Discharge: HOME | End: 2018-03-09
Attending: OBSTETRICS & GYNECOLOGY | Admitting: OBSTETRICS & GYNECOLOGY

## 2018-03-09 DIAGNOSIS — Z12.31 VISIT FOR SCREENING MAMMOGRAM: ICD-10-CM

## 2018-03-09 PROCEDURE — 77063 BREAST TOMOSYNTHESIS BI: CPT | Performed by: RADIOLOGY

## 2018-03-09 PROCEDURE — 77067 SCR MAMMO BI INCL CAD: CPT

## 2018-03-09 PROCEDURE — 77067 SCR MAMMO BI INCL CAD: CPT | Performed by: RADIOLOGY

## 2018-03-09 PROCEDURE — 77063 BREAST TOMOSYNTHESIS BI: CPT

## 2019-02-08 ENCOUNTER — OFFICE VISIT (OUTPATIENT)
Dept: OBSTETRICS AND GYNECOLOGY | Facility: CLINIC | Age: 54
End: 2019-02-08

## 2019-02-08 VITALS
DIASTOLIC BLOOD PRESSURE: 70 MMHG | SYSTOLIC BLOOD PRESSURE: 118 MMHG | WEIGHT: 161 LBS | HEIGHT: 66 IN | BODY MASS INDEX: 25.88 KG/M2

## 2019-02-08 DIAGNOSIS — M85.80 OSTEOPENIA, UNSPECIFIED LOCATION: ICD-10-CM

## 2019-02-08 DIAGNOSIS — Z01.419 ENCOUNTER FOR WELL WOMAN EXAM WITH ROUTINE GYNECOLOGICAL EXAM: Primary | ICD-10-CM

## 2019-02-08 DIAGNOSIS — N95.1 MENOPAUSAL SYMPTOMS: ICD-10-CM

## 2019-02-08 DIAGNOSIS — N39.3 SUI (STRESS URINARY INCONTINENCE, FEMALE): ICD-10-CM

## 2019-02-08 PROBLEM — Z98.890 S/P ENDOMETRIAL ABLATION: Status: RESOLVED | Noted: 2018-01-31 | Resolved: 2019-02-08

## 2019-02-08 PROBLEM — Z98.890 S/P BILATERAL BREAST REDUCTION: Status: RESOLVED | Noted: 2018-01-31 | Resolved: 2019-02-08

## 2019-02-08 PROCEDURE — 99396 PREV VISIT EST AGE 40-64: CPT | Performed by: OBSTETRICS & GYNECOLOGY

## 2019-02-08 RX ORDER — ESTRADIOL 0.07 MG/D
1 FILM, EXTENDED RELEASE TRANSDERMAL 2 TIMES WEEKLY
Qty: 24 PATCH | Refills: 3 | Status: SHIPPED | OUTPATIENT
Start: 2019-02-11 | End: 2020-02-14

## 2019-02-08 RX ORDER — SPIRONOLACTONE 100 MG/1
100 TABLET, FILM COATED ORAL DAILY
COMMUNITY
End: 2023-03-24

## 2019-02-08 NOTE — PROGRESS NOTES
"Subjective   Chief Complaint   Patient presents with   • Annual Exam     Echo Lane is a 53 y.o. year old  menopausal female presenting to be seen for her annual exam.  There has not been vaginal bleeding in the last 12 months.  Hot flashes and night sweats are not a significant problem.    SEXUAL Hx:  She is sexually active.  Vaginal dryness is not a problem.  [place] is painful:no  She has concerns about domestic violence: no    HEALTH Hx:  She exercises regularly: yes.  She wears her seat belt:yes.  Self breast awareness: no  She has noticed changes in height: no              Calcium intake is not adequate 2 daily              Caffeine intake: 2 equivalent cups of coffee    The following portions of the patient's history were reviewed and updated as appropriate:problem list, current medications, allergies, past family history, past medical history, past social history and past surgical history.    Current Outpatient Medications:   •  atorvastatin (LIPITOR) 10 MG tablet, Take 10 mg by mouth Daily., Disp: , Rfl:   •  progesterone (PROMETRIUM) 100 MG capsule, Take 1 capsule by mouth Every Night., Disp: 90 capsule, Rfl: 3  •  spironolactone (ALDACTONE) 100 MG tablet, Take 100 mg by mouth Daily., Disp: , Rfl:   •  thyroid (ARMOUR THYROID) 30 MG PO tablet, Take 15 mg by mouth Daily., Disp: , Rfl:   •  [START ON 2019] MINIVELLE 0.075 MG/24HR patch, Place 1 patch on the skin as directed by provider 2 (Two) Times a Week., Disp: 24 patch, Rfl: 3    Social History    Tobacco Use      Smoking status: Never Smoker      Smokeless tobacco: Never Used    Alcohol :  occasional/rare    Review of Systems   Her bowels and bladder are normal     Objective   /70   Ht 166.4 cm (65.5\")   Wt 73 kg (161 lb)   LMP  (LMP Unknown)   Breastfeeding? No   BMI 26.38 kg/m²      General:  well developed; well nourished  no acute distress  appears stated age   Skin:  No suspicious lesions seen   Thyroid: not " examined   Breasts:  Examined in supine position  Symmetric without masses or skin dimpling  Nipples normal without inversion, lesions or discharge  There are no palpable axillary nodes  Bilateral breast reduction scars are noted   Abdomen: soft, non-tender; no masses  no umbilical or inguinal hernias are present  no hepato-splenomegaly   Pelvis: Clinical staff was present for exam  External genitalia:  normal appearance of the external genitalia including Bartholin's and [place]'s glands.  :  urethral meatus normal;  Vaginal:  normal pink mucosa without prolapse or lesions. she is able to perform a Kegel contraction upon request;  Cervix:  normal appearance. Pap obtained  Uterus:  normal size, shape and consistency.  Adnexa:  non palpable bilaterally.       Lab Review   Pap test  Imaging review  Mammogram report       Assessment   1. Normal postmenopausal examination.  I had asked her about Estrogel.  She had been seeing someone else for hormone replacement therapy and they switched her back to the patch.  She is also on Prometrium 100 mg at night.  She would like me to go ahead and refill those and I will be happy to do that.  2. Stress urinary incontinence is not changed she is able to hold her urine and pretty well when she sneezes if she Kegel's down occasionally she will leak if is a very hard sneeze cough laugh.  3. Pap co-testing will be up-to-date now  4. Mammogram due March  5. Discussed colonoscopy  6. Lab work lipids and thyroid through primary care  7. Diagnosed with osteopenia 2017 Dr. Johnston's office.  Discussed calcium in diet and continued exercise and estrogen       Plan   1. Calcium discussed  1200 mg daily in divided doses ideally in diet  2. Regular weight bearing exercise  3. Breast self awareness, mammograms discussed  4. Annual or sooner as needed    New Medications Ordered This Visit   Medications   • progesterone (PROMETRIUM) 100 MG capsule     Sig: Take 1 capsule by mouth Every Night.      Dispense:  90 capsule     Refill:  3   • MINIVELLE 0.075 MG/24HR patch     Sig: Place 1 patch on the skin as directed by provider 2 (Two) Times a Week.     Dispense:  24 patch     Refill:  3       No orders of the defined types were placed in this encounter.           This note was electronically signed.    Ifeanyi George M.D.  February 8, 2019

## 2019-10-04 ENCOUNTER — TRANSCRIBE ORDERS (OUTPATIENT)
Dept: ADMINISTRATIVE | Facility: HOSPITAL | Age: 54
End: 2019-10-04

## 2019-10-04 ENCOUNTER — TELEPHONE (OUTPATIENT)
Dept: OBSTETRICS AND GYNECOLOGY | Facility: CLINIC | Age: 54
End: 2019-10-04

## 2019-10-04 DIAGNOSIS — Z12.31 VISIT FOR SCREENING MAMMOGRAM: Primary | ICD-10-CM

## 2019-10-04 RX ORDER — ESTRADIOL 0.07 MG/D
1 FILM, EXTENDED RELEASE TRANSDERMAL 2 TIMES WEEKLY
Qty: 8 PATCH | Refills: 4 | Status: SHIPPED | OUTPATIENT
Start: 2019-10-07 | End: 2020-02-14 | Stop reason: SDUPTHER

## 2019-10-08 ENCOUNTER — HOSPITAL ENCOUNTER (OUTPATIENT)
Dept: MAMMOGRAPHY | Facility: HOSPITAL | Age: 54
Discharge: HOME | End: 2019-10-08
Admitting: OBSTETRICS & GYNECOLOGY

## 2019-10-08 DIAGNOSIS — Z12.31 VISIT FOR SCREENING MAMMOGRAM: ICD-10-CM

## 2019-10-08 PROCEDURE — 77063 BREAST TOMOSYNTHESIS BI: CPT

## 2019-10-08 PROCEDURE — 77067 SCR MAMMO BI INCL CAD: CPT | Performed by: RADIOLOGY

## 2019-10-08 PROCEDURE — 77067 SCR MAMMO BI INCL CAD: CPT

## 2019-10-08 PROCEDURE — 77063 BREAST TOMOSYNTHESIS BI: CPT | Performed by: RADIOLOGY

## 2020-02-14 ENCOUNTER — OFFICE VISIT (OUTPATIENT)
Dept: OBSTETRICS AND GYNECOLOGY | Facility: CLINIC | Age: 55
End: 2020-02-14

## 2020-02-14 ENCOUNTER — TELEPHONE (OUTPATIENT)
Dept: OBSTETRICS AND GYNECOLOGY | Facility: CLINIC | Age: 55
End: 2020-02-14

## 2020-02-14 VITALS
DIASTOLIC BLOOD PRESSURE: 70 MMHG | WEIGHT: 156 LBS | SYSTOLIC BLOOD PRESSURE: 118 MMHG | BODY MASS INDEX: 25.99 KG/M2 | HEIGHT: 65 IN

## 2020-02-14 DIAGNOSIS — N95.1 MENOPAUSAL SYMPTOMS: Primary | ICD-10-CM

## 2020-02-14 PROCEDURE — 99396 PREV VISIT EST AGE 40-64: CPT | Performed by: OBSTETRICS & GYNECOLOGY

## 2020-02-14 RX ORDER — LEVOTHYROXINE, LIOTHYRONINE 38; 9 UG/1; UG/1
TABLET ORAL
COMMUNITY
Start: 2020-01-26

## 2020-02-14 RX ORDER — HYDROXYCHLOROQUINE SULFATE 200 MG/1
200 TABLET, FILM COATED ORAL 2 TIMES DAILY
COMMUNITY
Start: 2019-12-24 | End: 2021-03-05

## 2020-02-14 RX ORDER — ESTRADIOL 0.07 MG/D
1 FILM, EXTENDED RELEASE TRANSDERMAL 2 TIMES WEEKLY
Qty: 24 PATCH | Refills: 3 | Status: SHIPPED | OUTPATIENT
Start: 2020-02-17 | End: 2020-02-14

## 2020-02-14 RX ORDER — ESTRADIOL 0.05 MG/D
1 FILM, EXTENDED RELEASE TRANSDERMAL 2 TIMES WEEKLY
Qty: 24 PATCH | Refills: 3 | Status: SHIPPED | OUTPATIENT
Start: 2020-02-17 | End: 2021-03-05 | Stop reason: SDUPTHER

## 2020-02-14 NOTE — PROGRESS NOTES
Subjective   Chief Complaint   Patient presents with   • Annual Exam     hemorrhoid is acting up today     Echo Lane is a 54 y.o. year old  menopausal female presenting to be seen for her annual exam.  There has not been vaginal bleeding in the last 12 months.  Hot flashes and night sweats are not a significant problem.  She is doing fairly well with exception yesterday she had a flareup of an external hemorrhoid.  She is had these in the past.  Bleeding a little bit more today.  Told her we will call her in some notes from the [facility].  She will want to add a stool softener so she does not have to strain.  Also discussed colonoscopy.  She had one a few years ago may have been on a 5-year plan this was done at [facility] Dr. Nayely Persaud.  Her brother more recently has been diagnosed with colon cancer he is 59 years of age and her mother has colon polyps.  Family history was updated.  I would just suggest that she call these to let them know about the new information make sure that she is up-to-date with colonoscopy.    SEXUAL Hx:  She is sexually active.  Vaginal dryness is not a problem.  [place] is painful:no  She has concerns about domestic violence: no    HEALTH Hx:  She exercises regularly: yes.  She wears her seat belt:yes.  Self breast awareness: no  She has noticed changes in height: no              Calcium intake is not adequate 1 daily              Caffeine intake: caffeine use is moderate-to-high daily    The following portions of the patient's history were reviewed and updated as appropriate:problem list, current medications, allergies, past family history, past medical history, past social history and past surgical history.    Current Outpatient Medications:   •  atorvastatin (LIPITOR) 10 MG tablet, Take 10 mg by mouth Daily., Disp: , Rfl:   •  hydroxychloroquine (PLAQUENIL) 200 MG tablet, Take 200 mg by mouth 2 (Two) Times a Day., Disp: , Rfl:   •  NP THYROID 60 MG tablet, ,  "Disp: , Rfl:   •  progesterone (PROMETRIUM) 100 MG capsule, Take 1 capsule by mouth Every Night., Disp: 90 capsule, Rfl: 3  •  spironolactone (ALDACTONE) 100 MG tablet, Take 100 mg by mouth Daily., Disp: , Rfl:   •  hydrocortisone (ANUSOL-HC) 2.5 % rectal cream, Insert  into the rectum 2 (Two) Times a Day., Disp: 28.35 g, Rfl: 2  •  [START ON 2/17/2020] MINIVELLE 0.05 MG/24HR patch, Place 1 patch on the skin as directed by provider 2 (Two) Times a Week., Disp: 24 patch, Rfl: 3    Social History    Tobacco Use      Smoking status: Never Smoker      Smokeless tobacco: Never Used    Social History     Substance and Sexual Activity   Alcohol Use Yes    Comment: 3 drinks a week       Review of systems  Constitutional   POS nothing reported                           NEG night sweats, sweats and weight gain  Breast               POS nothing reported                           NEG persistent breast lump, skin dimpling, breast tenderness or nipple discharge  GI                     POS nothing reported                           NEG bloating, change in bowel habits, melena or reflux symptoms                      POS ALEXI is present but it IS NOT effecting her ADL's                           NEG dysuria, frequency or hematuria         Objective   /70   Ht 165.1 cm (65\")   Wt 70.8 kg (156 lb)   LMP  (LMP Unknown)   Breastfeeding No   BMI 25.96 kg/m²      General:  well developed; well nourished  no acute distress  appears stated age   Skin:  No suspicious lesions seen   Thyroid: not examined   Breasts:  Examined in supine position  Symmetric without masses or skin dimpling  Nipples normal without inversion, lesions or discharge  Fibrocystic changes are present both breasts without a discrete mass  Bilateral breast reduction scars are noted   Abdomen: soft, non-tender; no masses  no umbilical or inguinal hernias are present  no hepato-splenomegaly   Pelvis: Clinical staff was present for exam  External genitalia:  " normal appearance of the external genitalia including Bartholin's and [place]'s glands.  :  urethral meatus normal;  Vaginal:  normal pink mucosa without prolapse or lesions. she is able to perform a Kegel contraction upon request;  Uterus:  normal size, shape and consistency.  Adnexa:  non palpable bilaterally.  Rectal:  digital rectal exam not performed; external hemorrhoids present; Slight blood present       Lab Review   Pap test  Imaging review  Mammogram report        Assessment   1. Normal postmenopausal GYN examination; she would like to reduce the dose of Minivelle I think that would be fine to go from previous dose of 0.075 to the dose of 0.05  2. No history of brother developing colon cancer therefore will ask her to call or contact [facility] to make sure she is up-to-date with screening colonoscopy  3. Osteopenia based on DEXA scan and Ana Hull's office 2017  4. Rare stress incontinence with strong Kegel response       Plan   1. Annual or sooner as needed  2. Calcium discussed  1200 mg daily in divided doses ideally in diet  3. Regular weight bearing exercise  4. Breast self awareness, mammograms discussed  5. Colonoscopy discussed  6. Kegel's with a cough laugh and sneeze to prevent leakage when she is very full or if there is a harder cough.    New Medications Ordered This Visit   Medications   • progesterone (PROMETRIUM) 100 MG capsule     Sig: Take 1 capsule by mouth Every Night.     Dispense:  90 capsule     Refill:  3   • MINIVELLE 0.05 MG/24HR patch     Sig: Place 1 patch on the skin as directed by provider 2 (Two) Times a Week.     Dispense:  24 patch     Refill:  3   • hydrocortisone (ANUSOL-HC) 2.5 % rectal cream     Sig: Insert  into the rectum 2 (Two) Times a Day.     Dispense:  28.35 g     Refill:  2      No orders of the defined types were placed in this encounter.             This note was electronically signed.    Ifeanyi George M.D.  February 14, 2020

## 2020-02-14 NOTE — TELEPHONE ENCOUNTER
Patient called and stated that she was supposed to be getting Minivelle 0.05 patches sent into [email or web address] at [facility], but the pharmacy is telling her that they have the prescription for 0.075.  Could you please look into this and give her a call.

## 2020-05-18 ENCOUNTER — TRANSCRIBE ORDERS (OUTPATIENT)
Dept: ADMINISTRATIVE | Facility: HOSPITAL | Age: 55
End: 2020-05-18

## 2020-05-18 DIAGNOSIS — I51.7 RIGHT ATRIAL ENLARGEMENT: Primary | ICD-10-CM

## 2020-05-26 ENCOUNTER — HOSPITAL ENCOUNTER (OUTPATIENT)
Dept: CARDIOLOGY | Facility: HOSPITAL | Age: 55
Discharge: HOME | End: 2020-05-26
Admitting: FAMILY MEDICINE

## 2020-05-26 DIAGNOSIS — I51.7 RIGHT ATRIAL ENLARGEMENT: ICD-10-CM

## 2020-05-26 LAB
BH CV ECHO MEAS - AO MAX PG (FULL): 1.4 MMHG
BH CV ECHO MEAS - AO MAX PG: 4 MMHG
BH CV ECHO MEAS - AO MEAN PG (FULL): 1.2 MMHG
BH CV ECHO MEAS - AO MEAN PG: 2.7 MMHG
BH CV ECHO MEAS - AO ROOT AREA (BSA CORRECTED): 1.4
BH CV ECHO MEAS - AO ROOT AREA: 5.9 CM^2
BH CV ECHO MEAS - AO ROOT DIAM: 2.7 CM
BH CV ECHO MEAS - AO V2 MAX: 100.2 CM/SEC
BH CV ECHO MEAS - AO V2 MEAN: 78.6 CM/SEC
BH CV ECHO MEAS - AO V2 VTI: 28.8 CM
BH CV ECHO MEAS - ASC AORTA: 2.9 CM
BH CV ECHO MEAS - AVA(I,A): 2.2 CM^2
BH CV ECHO MEAS - AVA(I,D): 2.2 CM^2
BH CV ECHO MEAS - AVA(V,A): 2.5 CM^2
BH CV ECHO MEAS - AVA(V,D): 2.5 CM^2
BH CV ECHO MEAS - BSA(HAYCOCK): 1.9 M^2
BH CV ECHO MEAS - BSA: 2 M^2
BH CV ECHO MEAS - BZI_BMI: 19.5 KILOGRAMS/M^2
BH CV ECHO MEAS - BZI_METRIC_HEIGHT: 190.5 CM
BH CV ECHO MEAS - BZI_METRIC_WEIGHT: 70.8 KG
BH CV ECHO MEAS - EDV(CUBED): 47.7 ML
BH CV ECHO MEAS - EDV(MOD-SP4): 61 ML
BH CV ECHO MEAS - EDV(TEICH): 55.4 ML
BH CV ECHO MEAS - EF(CUBED): 66.8 %
BH CV ECHO MEAS - EF(MOD-SP4): 72.1 %
BH CV ECHO MEAS - EF(TEICH): 59.3 %
BH CV ECHO MEAS - ESV(CUBED): 15.8 ML
BH CV ECHO MEAS - ESV(MOD-SP4): 17 ML
BH CV ECHO MEAS - ESV(TEICH): 22.5 ML
BH CV ECHO MEAS - FS: 30.8 %
BH CV ECHO MEAS - IVS/LVPW: 1
BH CV ECHO MEAS - IVSD: 0.9 CM
BH CV ECHO MEAS - LA DIMENSION: 2.9 CM
BH CV ECHO MEAS - LA/AO: 1
BH CV ECHO MEAS - LAD MAJOR: 4.3 CM
BH CV ECHO MEAS - LAT PEAK E' VEL: 6 CM/SEC
BH CV ECHO MEAS - LATERAL E/E' RATIO: 7.5
BH CV ECHO MEAS - LV DIASTOLIC VOL/BSA (35-75): 30.9 ML/M^2
BH CV ECHO MEAS - LV MASS(C)D: 93.6 GRAMS
BH CV ECHO MEAS - LV MASS(C)DI: 47.4 GRAMS/M^2
BH CV ECHO MEAS - LV MAX PG: 2.6 MMHG
BH CV ECHO MEAS - LV MEAN PG: 1.5 MMHG
BH CV ECHO MEAS - LV SYSTOLIC VOL/BSA (12-30): 8.6 ML/M^2
BH CV ECHO MEAS - LV V1 MAX: 80.5 CM/SEC
BH CV ECHO MEAS - LV V1 MEAN: 57.7 CM/SEC
BH CV ECHO MEAS - LV V1 VTI: 21.1 CM
BH CV ECHO MEAS - LVIDD: 3.6 CM
BH CV ECHO MEAS - LVIDS: 2.5 CM
BH CV ECHO MEAS - LVLD AP4: 6.1 CM
BH CV ECHO MEAS - LVLS AP4: 5 CM
BH CV ECHO MEAS - LVOT AREA (M): 3.1 CM^2
BH CV ECHO MEAS - LVOT AREA: 3.1 CM^2
BH CV ECHO MEAS - LVOT DIAM: 2 CM
BH CV ECHO MEAS - LVPWD: 0.89 CM
BH CV ECHO MEAS - MED PEAK E' VEL: 6.8 CM/SEC
BH CV ECHO MEAS - MEDIAL E/E' RATIO: 6.6
BH CV ECHO MEAS - MV A MAX VEL: 64.2 CM/SEC
BH CV ECHO MEAS - MV DEC TIME: 0.11 SEC
BH CV ECHO MEAS - MV E MAX VEL: 46.4 CM/SEC
BH CV ECHO MEAS - MV E/A: 0.72
BH CV ECHO MEAS - MV MAX PG: 2.7 MMHG
BH CV ECHO MEAS - MV MEAN PG: 1.4 MMHG
BH CV ECHO MEAS - MV V2 MAX: 81.4 CM/SEC
BH CV ECHO MEAS - MV V2 MEAN: 55.4 CM/SEC
BH CV ECHO MEAS - MV V2 VTI: 25.7 CM
BH CV ECHO MEAS - MVA(VTI): 2.5 CM^2
BH CV ECHO MEAS - PA ACC SLOPE: 483.6 CM/SEC^2
BH CV ECHO MEAS - PA ACC TIME: 0.16 SEC
BH CV ECHO MEAS - PA MAX PG: 2.9 MMHG
BH CV ECHO MEAS - PA PR(ACCEL): 8.5 MMHG
BH CV ECHO MEAS - PA V2 MAX: 85 CM/SEC
BH CV ECHO MEAS - RVDD: 1.3 CM
BH CV ECHO MEAS - SI(AO): 86.1 ML/M^2
BH CV ECHO MEAS - SI(CUBED): 16.1 ML/M^2
BH CV ECHO MEAS - SI(LVOT): 32.7 ML/M^2
BH CV ECHO MEAS - SI(MOD-SP4): 22.3 ML/M^2
BH CV ECHO MEAS - SI(TEICH): 16.6 ML/M^2
BH CV ECHO MEAS - SV(AO): 170.1 ML
BH CV ECHO MEAS - SV(CUBED): 31.9 ML
BH CV ECHO MEAS - SV(LVOT): 64.6 ML
BH CV ECHO MEAS - SV(MOD-SP4): 44 ML
BH CV ECHO MEAS - SV(TEICH): 32.8 ML
BH CV ECHO MEAS - TAPSE (>1.6): 2.1 CM2
BH CV ECHO MEAS - TV MAX PG: 0.64 MMHG
BH CV ECHO MEAS - TV V2 MAX: 40 CM/SEC
BH CV ECHO MEASUREMENTS AVERAGE E/E' RATIO: 7.25
BH CV VAS BP RIGHT ARM: NORMAL MMHG
BH CV XLRA - RV BASE: 3.2 CM
BH CV XLRA - RV LENGTH: 5.1 CM
BH CV XLRA - RV MID: 2.7 CM
BH CV XLRA - TDI S': 14 CM/SEC
MAXIMAL PREDICTED HEART RATE: 164 BPM
STRESS TARGET HR: 139 BPM

## 2020-05-26 PROCEDURE — 93306 TTE W/DOPPLER COMPLETE: CPT

## 2020-05-26 PROCEDURE — 93306 TTE W/DOPPLER COMPLETE: CPT | Performed by: INTERNAL MEDICINE

## 2020-08-24 ENCOUNTER — TRANSCRIBE ORDERS (OUTPATIENT)
Dept: ADMINISTRATIVE | Facility: HOSPITAL | Age: 55
End: 2020-08-24

## 2020-08-24 DIAGNOSIS — Z12.31 VISIT FOR SCREENING MAMMOGRAM: Primary | ICD-10-CM

## 2020-12-17 ENCOUNTER — HOSPITAL ENCOUNTER (OUTPATIENT)
Dept: MAMMOGRAPHY | Facility: HOSPITAL | Age: 55
Discharge: HOME | End: 2020-12-17
Admitting: OBSTETRICS & GYNECOLOGY

## 2020-12-17 DIAGNOSIS — Z12.31 VISIT FOR SCREENING MAMMOGRAM: ICD-10-CM

## 2020-12-17 PROCEDURE — 77067 SCR MAMMO BI INCL CAD: CPT

## 2020-12-17 PROCEDURE — 77063 BREAST TOMOSYNTHESIS BI: CPT | Performed by: RADIOLOGY

## 2020-12-17 PROCEDURE — 77063 BREAST TOMOSYNTHESIS BI: CPT

## 2020-12-17 PROCEDURE — 77067 SCR MAMMO BI INCL CAD: CPT | Performed by: RADIOLOGY

## 2021-03-05 ENCOUNTER — OFFICE VISIT (OUTPATIENT)
Dept: OBSTETRICS AND GYNECOLOGY | Facility: CLINIC | Age: 56
End: 2021-03-05

## 2021-03-05 VITALS
DIASTOLIC BLOOD PRESSURE: 70 MMHG | WEIGHT: 144 LBS | SYSTOLIC BLOOD PRESSURE: 118 MMHG | HEIGHT: 65 IN | BODY MASS INDEX: 23.99 KG/M2

## 2021-03-05 DIAGNOSIS — Z01.411 ENCOUNTER FOR GYNECOLOGICAL EXAMINATION WITH ABNORMAL FINDING: Primary | ICD-10-CM

## 2021-03-05 DIAGNOSIS — N39.3 SUI (STRESS URINARY INCONTINENCE, FEMALE): ICD-10-CM

## 2021-03-05 DIAGNOSIS — M85.80 OSTEOPENIA, UNSPECIFIED LOCATION: ICD-10-CM

## 2021-03-05 PROCEDURE — 99396 PREV VISIT EST AGE 40-64: CPT | Performed by: OBSTETRICS & GYNECOLOGY

## 2021-03-05 RX ORDER — ESTRADIOL 0.05 MG/D
1 FILM, EXTENDED RELEASE TRANSDERMAL 2 TIMES WEEKLY
Qty: 24 PATCH | Refills: 3 | Status: SHIPPED | OUTPATIENT
Start: 2021-03-08 | End: 2021-04-14

## 2021-03-05 NOTE — PROGRESS NOTES
Subjective   Chief Complaint   Patient presents with   • Annual Exam     Echo Lane is a 55 y.o. year old  menopausal female presenting to be seen for her annual exam.  There has not been vaginal bleeding in the last 12 months.  Hot flashes and night sweats are not a significant problem.  She did have Covid in December.  Count of like a bad cough or cold stuffy nose and did lose her sense of smell but she has recovered.   apparently got the actual vaccine and [employer] trial.    SEXUAL Hx:  She is sexually active.  Vaginal dryness is not a problem.  [place] is painful:no  She has concerns about domestic violence: no  Discussed risk for STD and sexual behavior.    HEALTH Hx:  She exercises regularly: yes.  She wears her seat belt:yes.  Self breast awareness: yes  She has noticed changes in height: no              Calcium intake is adequate 1000+ daily              Caffeine intake: caffeine use is moderate  daily              Discussed immunizations, screenings, mental and dental health.    The following portions of the patient's history were reviewed and updated as appropriate:problem list, current medications, allergies, past family history, past medical history, past social history and past surgical history.      Current Outpatient Medications:   •  atorvastatin (LIPITOR) 10 MG tablet, Take 10 mg by mouth Daily., Disp: , Rfl:   •  hydrocortisone (ANUSOL-HC) 2.5 % rectal cream, Insert  into the rectum 2 (Two) Times a Day., Disp: 28.35 g, Rfl: 2  •  [START ON 3/8/2021] Minivelle 0.05 MG/24HR patch, Place 1 patch on the skin as directed by provider 2 (Two) Times a Week., Disp: 24 patch, Rfl: 3  •  NP THYROID 60 MG tablet, , Disp: , Rfl:   •  progesterone (PROMETRIUM) 100 MG capsule, Take 1 capsule by mouth Every Night., Disp: 90 capsule, Rfl: 3  •  spironolactone (ALDACTONE) 100 MG tablet, Take 100 mg by mouth Daily., Disp: , Rfl:   •  Estradiol (ESTROGEL) 0.75 MG/1.25 GM (0.06%) topical  "gel, Place 1.25 g on the skin Daily., Disp: 3 bottle, Rfl: 3    Social History    Tobacco Use      Smoking status: Never Smoker      Smokeless tobacco: Never Used    Social History     Substance and Sexual Activity   Alcohol Use Yes    Comment: 3 drinks a week     Discussed avoidance of illicit drugs    Review of systems  Constitutional   POS weight loss                           NEG fevers, malaise, night sweats and sweats  Breast               POS nothing reported                           NEG persistent breast lump, skin dimpling, breast tenderness or nipple discharge  GI                     POS nothing reported                           NEG bloating, change in bowel habits, melena or reflux symptoms                      POS nothing reported rare ALEXI                           NEG dysuria, frequency or hematuria           Objective   /70   Ht 165.1 cm (65\")   Wt 65.3 kg (144 lb)   Breastfeeding No   BMI 23.96 kg/m²      General:  well developed; well nourished  no acute distress  appears stated age   Skin:  No suspicious lesions seen   Thyroid: not examined   Breasts:  Examined in supine position  Symmetric without masses or skin dimpling  Nipples normal without inversion, lesions or discharge  Fibrocystic changes are present both breasts without a discrete mass  Bilateral breast reduction scars are noted   Abdomen: soft, non-tender; no masses  no umbilical or inguinal hernias are present  no hepato-splenomegaly   Pelvis: Clinical staff was present for exam  External genitalia:  normal appearance of the external genitalia including Bartholin's and [place]'s glands.  :  urethral meatus normal;  Vaginal:  normal pink mucosa without prolapse or lesions. she is able to perform a Kegel contraction upon request;  Uterus:  normal size, shape and consistency.  Adnexa:  non palpable bilaterally.  Rectal:  digital rectal exam not performed; anus visually normal appearing.       Lab Review   Pap test  Imaging " review  Mammogram report               Assessment     1. Normal GYN examination doing well on hormone replacement therapy  2. Rare stress urinary incontinence.  Discussed Kegel exercises with every cough laugh or sneeze.  If this worsens potentially do the Kegel's 5 minutes daily and as needed or consider pessary with exercise etc. prior to surgery.  3. Osteopenia sounds like she is getting enough calcium and exercise.  Would encourage continued HRT  4.   She is due for colonoscopy this year at [facility].  Family history of colon cancer brother colon polyps in her mother.  She did not have any polyps.       Plan     1. Annual or sooner as needed; Pap cotesting at return to office  2. Calcium discussed  1200 mg daily in divided doses ideally in diet  3. Regular weight bearing exercise, nutrition, injury avoidance and maintaining healthy weight discussed  4. Breast self awareness and mammogram December 2021  5. Colonoscopy due later this year at [facility]  6. Timed voiding and Kegel's with every cough laugh or sneeze; as above    New Medications Ordered This Visit   Medications   • Minivelle 0.05 MG/24HR patch     Sig: Place 1 patch on the skin as directed by provider 2 (Two) Times a Week.     Dispense:  24 patch     Refill:  3   • progesterone (PROMETRIUM) 100 MG capsule     Sig: Take 1 capsule by mouth Every Night.     Dispense:  90 capsule     Refill:  3      No orders of the defined types were placed in this encounter.             This note was electronically signed.    Ifeanyi George M.D.  March 5, 2021

## 2021-04-14 NOTE — TELEPHONE ENCOUNTER
Patient was  here on 03/5/21 for annual.   Her insurance only covers generic patches.  Can you sign prescribtion please.

## 2021-04-15 RX ORDER — ESTRADIOL 0.05 MG/D
1 FILM, EXTENDED RELEASE TRANSDERMAL WEEKLY
Qty: 24 PATCH | Refills: 3 | Status: SHIPPED | OUTPATIENT
Start: 2021-04-15 | End: 2022-03-11 | Stop reason: SDUPTHER

## 2021-05-18 ENCOUNTER — TELEPHONE (OUTPATIENT)
Dept: OBSTETRICS AND GYNECOLOGY | Facility: CLINIC | Age: 56
End: 2021-05-18

## 2021-05-18 NOTE — TELEPHONE ENCOUNTER
I have called the pharmacy to clarify directions on the patient's estradiol patch.  The patch ordered is a twice weekly patch but the sig sent with the prescription indicated to use weekly.  I spoke with the pharmacist after looking at Dr. George's office note and asked that the patient change her patch every 3 1/2 days.

## 2021-05-18 NOTE — TELEPHONE ENCOUNTER
PT CALLED - STATES HER ESTROGEN WAS CALLED IN - BUT WAS NOT CALLED IN AS GENERIC AND THE PHARMACY WILL NOT FILL IT - -  PLEASE CALL IT BACK IN AS A GENERIC --LEELEE BARBER

## 2022-02-11 ENCOUNTER — TRANSCRIBE ORDERS (OUTPATIENT)
Dept: PREADMISSION TESTING | Facility: HOSPITAL | Age: 57
End: 2022-02-11

## 2022-02-11 ENCOUNTER — TRANSCRIBE ORDERS (OUTPATIENT)
Dept: ADMINISTRATIVE | Facility: HOSPITAL | Age: 57
End: 2022-02-11

## 2022-02-11 DIAGNOSIS — Z12.31 VISIT FOR SCREENING MAMMOGRAM: Primary | ICD-10-CM

## 2022-03-11 ENCOUNTER — OFFICE VISIT (OUTPATIENT)
Dept: OBSTETRICS AND GYNECOLOGY | Facility: CLINIC | Age: 57
End: 2022-03-11

## 2022-03-11 VITALS
DIASTOLIC BLOOD PRESSURE: 70 MMHG | WEIGHT: 153 LBS | SYSTOLIC BLOOD PRESSURE: 118 MMHG | RESPIRATION RATE: 16 BRPM | BODY MASS INDEX: 25.46 KG/M2

## 2022-03-11 DIAGNOSIS — N95.1 MENOPAUSAL SYMPTOMS: ICD-10-CM

## 2022-03-11 DIAGNOSIS — Z01.419 ENCOUNTER FOR WELL WOMAN EXAM WITH ROUTINE GYNECOLOGICAL EXAM: Primary | ICD-10-CM

## 2022-03-11 PROCEDURE — 99396 PREV VISIT EST AGE 40-64: CPT | Performed by: NURSE PRACTITIONER

## 2022-03-11 RX ORDER — PROGESTERONE 100 MG/1
100 CAPSULE ORAL DAILY
Qty: 90 CAPSULE | Refills: 3 | Status: SHIPPED | OUTPATIENT
Start: 2022-03-11 | End: 2023-03-09

## 2022-03-11 RX ORDER — ESTRADIOL 0.05 MG/D
1 FILM, EXTENDED RELEASE TRANSDERMAL WEEKLY
Qty: 12 PATCH | Refills: 4 | Status: SHIPPED | OUTPATIENT
Start: 2022-03-11 | End: 2022-03-14 | Stop reason: SDUPTHER

## 2022-03-11 NOTE — PROGRESS NOTES
Annual Visit     Patient Name: Echo Lane  : 1965   MRN: [id number]   Care Team: Patient Care Team:  Ana Johnston MD as PCP - General (Family Medicine)  Ana Johnston MD as Referring Physician (Family Medicine)    Chief Complaint:    Chief Complaint   Patient presents with   • Annual Exam       HPI: Echo Lane is a 56 y.o. year old  presenting to be seen for her gynecologic exam.   Endometrial ablation  - amenorrhea   Postmenopausal - no bldg in the last year     Pap smear  WNL and HPV negative     Doing well with HRT   Estradiol patch 0.05mg   Prometrium 100mg   Menopausal sx well controlled     Mammogram 2020 birads 1   Has appt scheduled in April     Colonoscopy  rpt 5 yrs per pt   Brother with hx of colon cancer     DEXA done with PCP   Takes daily calcium and vit d     Hx hypothyroidism - sees PCP for mgmt     Has been a pt of Dr. George since she was in her 20s       Subjective      /70   Resp 16   Wt 69.4 kg (153 lb)   Breastfeeding No   BMI 25.46 kg/m²     BMI reviewed: Body mass index is 25.46 kg/m².      Objective     Physical Exam    Neuro: alert and oriented to person, place and time   General:  alert; cooperative; well developed; well nourished   Skin:  No suspicious lesions seen   Thyroid: normal to inspection and palpation   Lungs:  breathing is unlabored  clear to auscultation bilaterally   Heart:  regular rate and rhythm, S1, S2 normal, no murmur, click, rub or gallop  normal apical impulse   Breasts:  Examined in supine position  Symmetric without masses or skin dimpling  Nipples normal without inversion, lesions or discharge  There are no palpable axillary nodes  s/p bilateral reduction   Abdomen: soft, non-tender; no masses  no umbilical or inguinal hernias are present  no hepato-splenomegaly   Pelvis: Clinical staff was present for exam  External genitalia:  normal appearance of the external genitalia including Bartholin's and  [place]'s glands.  :  urethral meatus normal;  Vaginal:  normal pink mucosa without prolapse or lesions.  Cervix:  normal appearance.  Uterus:  normal size, shape and consistency.  Adnexa:  non palpable bilaterally.  Rectal:  digital rectal exam not performed; anus visually normal appearing.         Assessment / Plan      Assessment  Problems Addressed This Visit    ICD-10-CM ICD-9-CM   1. Encounter for well woman exam with routine gynecological exam  Z01.419 V72.31   2. Menopausal symptoms  N95.1 627.2       Plan    Pap smear pending     Discussed monthly SBEs and importance of annual imaging   Keep mammogram appt in April     No C/Is to cont HRT   Estradiol patch 0.05mg   Prometrium 100mg qd - refills to pharmacy     Cont with daily calcium and vit d     AV 1 yr             Follow Up  Return in about 1 year (around 3/11/2023) for Annual physical.  Patient was given instructions and counseling regarding her condition or for health maintenance advice. Please see specific information pulled into the AVS if appropriate.     Cammy Dunn, CHRISTY  March 11, 2022  10:23 EST

## 2022-03-14 ENCOUNTER — TELEPHONE (OUTPATIENT)
Dept: OBSTETRICS AND GYNECOLOGY | Facility: CLINIC | Age: 57
End: 2022-03-14

## 2022-03-14 RX ORDER — ESTRADIOL 0.05 MG/D
1 FILM, EXTENDED RELEASE TRANSDERMAL 2 TIMES WEEKLY
Qty: 24 PATCH | Refills: 3 | Status: SHIPPED | OUTPATIENT
Start: 2022-03-14 | End: 2023-03-24 | Stop reason: SDUPTHER

## 2022-03-14 NOTE — TELEPHONE ENCOUNTER
Rx Refill Note  Recent prescription was sent in incorrectly.  I will correct at this time.  Requested Prescriptions      No prescriptions requested or ordered in this encounter      Last office visit with prescribing clinician: 3/11/2022      Next office visit with prescribing clinician: 3/17/2023            Ioana Demarco MA  03/14/22, 17:07 EDT

## 2022-03-18 DIAGNOSIS — Z01.419 ENCOUNTER FOR WELL WOMAN EXAM WITH ROUTINE GYNECOLOGICAL EXAM: ICD-10-CM

## 2022-04-15 ENCOUNTER — HOSPITAL ENCOUNTER (OUTPATIENT)
Dept: MAMMOGRAPHY | Facility: HOSPITAL | Age: 57
Discharge: HOME | End: 2022-04-15
Admitting: NURSE PRACTITIONER

## 2022-04-15 DIAGNOSIS — Z12.31 VISIT FOR SCREENING MAMMOGRAM: ICD-10-CM

## 2022-04-15 PROCEDURE — 77067 SCR MAMMO BI INCL CAD: CPT

## 2022-04-15 PROCEDURE — 77063 BREAST TOMOSYNTHESIS BI: CPT

## 2022-04-15 PROCEDURE — 77067 SCR MAMMO BI INCL CAD: CPT | Performed by: RADIOLOGY

## 2022-04-15 PROCEDURE — 77063 BREAST TOMOSYNTHESIS BI: CPT | Performed by: RADIOLOGY

## 2022-09-30 ENCOUNTER — TELEPHONE (OUTPATIENT)
Dept: OBSTETRICS AND GYNECOLOGY | Facility: CLINIC | Age: 57
End: 2022-09-30

## 2022-09-30 NOTE — TELEPHONE ENCOUNTER
Estradiol patch was previously given for once weekly and now it states twice weekly  Correction given

## 2023-03-09 RX ORDER — PROGESTERONE 100 MG/1
CAPSULE ORAL
Qty: 30 CAPSULE | Refills: 0 | Status: SHIPPED | OUTPATIENT
Start: 2023-03-09 | End: 2023-03-24 | Stop reason: SDUPTHER

## 2023-03-24 ENCOUNTER — OFFICE VISIT (OUTPATIENT)
Dept: OBSTETRICS AND GYNECOLOGY | Facility: CLINIC | Age: 58
End: 2023-03-24
Payer: COMMERCIAL

## 2023-03-24 ENCOUNTER — TRANSCRIBE ORDERS (OUTPATIENT)
Dept: ADMINISTRATIVE | Facility: HOSPITAL | Age: 58
End: 2023-03-24
Payer: COMMERCIAL

## 2023-03-24 VITALS
DIASTOLIC BLOOD PRESSURE: 60 MMHG | WEIGHT: 165 LBS | RESPIRATION RATE: 16 BRPM | BODY MASS INDEX: 27.46 KG/M2 | SYSTOLIC BLOOD PRESSURE: 112 MMHG

## 2023-03-24 DIAGNOSIS — K64.4 EXTERNAL HEMORRHOIDS: ICD-10-CM

## 2023-03-24 DIAGNOSIS — M85.80 OSTEOPENIA, SENILE: ICD-10-CM

## 2023-03-24 DIAGNOSIS — Z12.31 VISIT FOR SCREENING MAMMOGRAM: Primary | ICD-10-CM

## 2023-03-24 DIAGNOSIS — Z01.411 ENCOUNTER FOR GYNECOLOGICAL EXAMINATION WITH ABNORMAL FINDING: Primary | ICD-10-CM

## 2023-03-24 DIAGNOSIS — N95.1 MENOPAUSAL SYMPTOMS: ICD-10-CM

## 2023-03-24 DIAGNOSIS — R68.82 DECREASED LIBIDO: ICD-10-CM

## 2023-03-24 RX ORDER — PROGESTERONE 100 MG/1
100 CAPSULE ORAL DAILY
Qty: 90 CAPSULE | Refills: 3 | Status: SHIPPED | OUTPATIENT
Start: 2023-03-24

## 2023-03-24 RX ORDER — HYDROCORTISONE 25 MG/G
CREAM TOPICAL
Qty: 30 G | Refills: 3 | Status: SHIPPED | OUTPATIENT
Start: 2023-03-24

## 2023-03-24 RX ORDER — ESTRADIOL 0.05 MG/D
1 FILM, EXTENDED RELEASE TRANSDERMAL 2 TIMES WEEKLY
Qty: 24 PATCH | Refills: 3 | Status: SHIPPED | OUTPATIENT
Start: 2023-03-27

## 2023-03-24 NOTE — PROGRESS NOTES
Annual Visit     Patient Name: Echo Lane  : 1965   MRN: [id number]   Care Team: Patient Care Team:  Ana Johnston MD as PCP - General (Family Medicine)  Ana Johnston MD as Referring Physician (Family Medicine)    Chief Complaint:    Chief Complaint   Patient presents with   • Annual Exam       HPI: Echo Lane is a 57 y.o. year old  presenting to be seen for her gynecologic exam.   Endometrial ablation  - amenorrhea   Postmenopausal - no bldg in the last year      Pap smear 3/2022 WNL and HPV negative      Doing well with HRT   Estradiol patch 0.05mg   Prometrium 100mg   Menopausal sx well controlled   C/o decrease libido - would like to discuss mgmt options   Hx hypothyroidism - has labs routinely with PCP and WNL per pt      Mammogram 2022 birads 2      Colonoscopy  rpt 5 yrs per pt   Brother with hx of colon cancer      DEXA done with PCP - osteopenia   Takes daily calcium and vit d      C/o weight gain - having foot pain, needs bunion removed   Her mother passed away a yr ago and that has been hard       Subjective      I have reviewed the patients family history, social history, past medical history, past surgical history and have updated it as appropriate.    /60   Resp 16   Wt 74.8 kg (165 lb)   BMI 27.46 kg/m²     BMI reviewed: Body mass index is 27.46 kg/m².      Objective     Physical Exam    Neuro: alert and oriented to person, place and time   General:  alert; cooperative; well developed; well nourished   Skin:  No suspicious lesions seen   Thyroid: normal to inspection and palpation   Lungs:  breathing is unlabored  clear to auscultation bilaterally   Heart:  regular rate and rhythm, S1, S2 normal, no murmur, click, rub or gallop  normal apical impulse   Breasts:  Examined in supine position  Symmetric without masses or skin dimpling  Nipples normal without inversion, lesions or discharge  There are no palpable axillary nodes  S/p bilateral  reduction   Abdomen: soft, non-tender; no masses  no umbilical or inguinal hernias are present  no hepato-splenomegaly   Pelvis: Clinical staff was present for exam  External genitalia:  normal appearance of the external genitalia including Bartholin's and [place]'s glands.  :  urethral meatus normal;  Vaginal:  normal pink mucosa without prolapse or lesions.  Cervix:  normal appearance.  Uterus:  normal size, shape and consistency.  Adnexa:  normal bimanual exam of the adnexa.  Rectal:  digital rectal exam not performed; anus visually normal appearing. external hemorrhoids present;         Assessment / Plan      Assessment  Problems Addressed This Visit    ICD-10-CM ICD-9-CM   1. Encounter for gynecological examination with abnormal finding  Z01.411 V72.31   2. Menopausal symptoms  N95.1 627.2   3. Decreased libido  R68.82 799.81   4. External hemorrhoids  K64.4 455.3   5. Osteopenia, senile  M85.80 733.90       Plan    Pap smear not indicated      Discussed monthly SBEs and importance of annual imaging   Mammogram due April 2023      No C/Is to cont HRT   Estradiol patch 0.05mg   Prometrium 100mg qd - refills to pharmacy   Discussed trial of testosterone cream - will start with 2.5mg qd and may increase to 5mg qd if needed  She will call in 2-3 months to f/u   Discussed reducing estradiol dose, but will make one change at a time, start with adding testosterone cream   Script called to [facility]      Cont with daily calcium and vit d     Refill Anusol cream - it if helpful prn      AV 1 yr         40 to 64: Counseling/Anticipatory Guidance Discussed: injury prevention, screenings and self-breast exam    Follow Up  Return in about 1 year (around 3/24/2024) for Annual physical.  Patient was given instructions and counseling regarding her condition or for health maintenance advice. Please see specific information pulled into the AVS if appropriate.     Cammy Dunn, APRN  March 24, 2023  10:09  EDT

## 2023-06-02 ENCOUNTER — HOSPITAL ENCOUNTER (OUTPATIENT)
Dept: MAMMOGRAPHY | Facility: HOSPITAL | Age: 58
Discharge: HOME | End: 2023-06-02
Admitting: NURSE PRACTITIONER

## 2023-06-02 DIAGNOSIS — Z12.31 VISIT FOR SCREENING MAMMOGRAM: ICD-10-CM

## 2023-06-02 PROCEDURE — 77063 BREAST TOMOSYNTHESIS BI: CPT

## 2023-06-02 PROCEDURE — 77067 SCR MAMMO BI INCL CAD: CPT

## 2023-10-31 ENCOUNTER — LAB (OUTPATIENT)
Dept: LAB | Facility: HOSPITAL | Age: 58
End: 2023-10-31
Payer: COMMERCIAL

## 2023-10-31 ENCOUNTER — HOSPITAL ENCOUNTER (OUTPATIENT)
Dept: CARDIOLOGY | Facility: HOSPITAL | Age: 58
Discharge: HOME | End: 2023-10-31
Payer: COMMERCIAL

## 2023-10-31 ENCOUNTER — HOSPITAL ENCOUNTER (OUTPATIENT)
Dept: GENERAL RADIOLOGY | Facility: HOSPITAL | Age: 58
Discharge: HOME | End: 2023-10-31
Payer: COMMERCIAL

## 2023-10-31 ENCOUNTER — TRANSCRIBE ORDERS (OUTPATIENT)
Dept: LAB | Facility: HOSPITAL | Age: 58
End: 2023-10-31
Payer: COMMERCIAL

## 2023-10-31 ENCOUNTER — TRANSCRIBE ORDERS (OUTPATIENT)
Dept: GENERAL RADIOLOGY | Facility: HOSPITAL | Age: 58
End: 2023-10-31
Payer: COMMERCIAL

## 2023-10-31 ENCOUNTER — TRANSCRIBE ORDERS (OUTPATIENT)
Dept: CARDIOLOGY | Facility: HOSPITAL | Age: 58
End: 2023-10-31
Payer: COMMERCIAL

## 2023-10-31 DIAGNOSIS — Z01.818 PRE-OP TESTING: ICD-10-CM

## 2023-10-31 DIAGNOSIS — R73.09 IMPAIRED GLUCOSE TOLERANCE TEST: ICD-10-CM

## 2023-10-31 DIAGNOSIS — Z01.818 PRE-OP TESTING: Primary | ICD-10-CM

## 2023-10-31 DIAGNOSIS — Z87.898 PERSONAL HISTORY OF OTHER LYMPHATIC AND HEMATOPOIETIC NEOPLASM: Primary | ICD-10-CM

## 2023-10-31 DIAGNOSIS — Z01.818 OTHER SPECIFIED PRE-OPERATIVE EXAMINATION: ICD-10-CM

## 2023-10-31 DIAGNOSIS — Z01.818 PRE-OPERATIVE EXAMINATION: Primary | ICD-10-CM

## 2023-10-31 DIAGNOSIS — Z87.898 PERSONAL HISTORY OF OTHER LYMPHATIC AND HEMATOPOIETIC NEOPLASM: ICD-10-CM

## 2023-10-31 DIAGNOSIS — Z01.818 PRE-OPERATIVE EXAMINATION: ICD-10-CM

## 2023-10-31 LAB
25(OH)D3 SERPL-MCNC: 37.3 NG/ML (ref 30–100)
ANION GAP SERPL CALCULATED.3IONS-SCNC: 8 MMOL/L (ref 5–15)
BUN SERPL-MCNC: 15 MG/DL (ref 6–20)
BUN/CREAT SERPL: 24.2 (ref 7–25)
CALCIUM SPEC-SCNC: 9.3 MG/DL (ref 8.6–10.5)
CHLORIDE SERPL-SCNC: 103 MMOL/L (ref 98–107)
CO2 SERPL-SCNC: 29 MMOL/L (ref 22–29)
CREAT SERPL-MCNC: 0.62 MG/DL (ref 0.57–1)
DEPRECATED RDW RBC AUTO: 39.7 FL (ref 37–54)
EGFRCR SERPLBLD CKD-EPI 2021: 102.7 ML/MIN/1.73
ERYTHROCYTE [DISTWIDTH] IN BLOOD BY AUTOMATED COUNT: 12.6 % (ref 12.3–15.4)
GLUCOSE SERPL-MCNC: 84 MG/DL (ref 65–99)
HCT VFR BLD AUTO: 44.3 % (ref 34–46.6)
HGB BLD-MCNC: 14.5 G/DL (ref 12–15.9)
MCH RBC QN AUTO: 28.3 PG (ref 26.6–33)
MCHC RBC AUTO-ENTMCNC: 32.7 G/DL (ref 31.5–35.7)
MCV RBC AUTO: 86.4 FL (ref 79–97)
PLATELET # BLD AUTO: 204 10*3/MM3 (ref 140–450)
PMV BLD AUTO: 12 FL (ref 6–12)
POTASSIUM SERPL-SCNC: 4 MMOL/L (ref 3.5–5.2)
RBC # BLD AUTO: 5.13 10*6/MM3 (ref 3.77–5.28)
SODIUM SERPL-SCNC: 140 MMOL/L (ref 136–145)
WBC NRBC COR # BLD: 7.09 10*3/MM3 (ref 3.4–10.8)

## 2023-10-31 PROCEDURE — 71046 X-RAY EXAM CHEST 2 VIEWS: CPT

## 2023-10-31 PROCEDURE — 93005 ELECTROCARDIOGRAM TRACING: CPT | Performed by: ORTHOPAEDIC SURGERY

## 2023-10-31 PROCEDURE — 85027 COMPLETE CBC AUTOMATED: CPT

## 2023-10-31 PROCEDURE — 80048 BASIC METABOLIC PNL TOTAL CA: CPT

## 2023-10-31 PROCEDURE — 36415 COLL VENOUS BLD VENIPUNCTURE: CPT

## 2023-10-31 PROCEDURE — 82306 VITAMIN D 25 HYDROXY: CPT

## 2023-11-01 LAB
QT INTERVAL: 370 MS
QTC INTERVAL: 393 MS

## 2023-12-21 ENCOUNTER — TRANSCRIBE ORDERS (OUTPATIENT)
Dept: PHYSICAL THERAPY | Facility: CLINIC | Age: 58
End: 2023-12-21
Payer: COMMERCIAL

## 2023-12-21 ENCOUNTER — TREATMENT (OUTPATIENT)
Dept: PHYSICAL THERAPY | Facility: CLINIC | Age: 58
End: 2023-12-21
Payer: COMMERCIAL

## 2023-12-21 DIAGNOSIS — M79.674 PAIN OF RIGHT GREAT TOE: Primary | ICD-10-CM

## 2023-12-21 PROCEDURE — 97161 PT EVAL LOW COMPLEX 20 MIN: CPT | Performed by: PHYSICAL THERAPIST

## 2023-12-21 PROCEDURE — 97140 MANUAL THERAPY 1/> REGIONS: CPT | Performed by: PHYSICAL THERAPIST

## 2023-12-21 PROCEDURE — 97110 THERAPEUTIC EXERCISES: CPT | Performed by: PHYSICAL THERAPIST

## 2023-12-21 NOTE — PROGRESS NOTES
Physical Therapy Initial Evaluation and Plan of Care    [facility]  [address]  [place] [id number] [phone]    Patient: Echo Lane   : 1965  Diagnosis/ICD-10 Code:  No primary diagnosis found.  Referring practitioner: Arcadio Galdamez Jr., MD    Subjective Evaluation    History of Present Illness  Date of onset: 2021  Date of surgery: 2023  Mechanism of injury: Chronic    Subjective comment: Started having right great toe swelling about 2 years ago.  The great toe started to rotate toward the second toe about a year ago.  Pain was worsening as well.  Elected to have surgery to repair the bunion of the right great toe.  Was having tingling in the second toe before surgery.  Difficult to tell if that is tingling of right now.  Was provided an Ortho shoe from her last visit with orthopedic surgeon to transition to.  Currently wearing a boot.  Is weightbearing as tolerated.  Unable to bathe. (Previous history of L5-S1 fusion 10 years ago.  Returns to orthopedic surgeon on January 15, 2024.  Plans to go to [place] for 2 months in mid to late January timeframe.)Pain  Alleviating factors: Aleve as needed.  Exacerbated by: Stinging dorsal aspect of right foot at night.    Patient Goals  Patient goals for therapy: decreased pain, increased motion, improved balance, return to sport/leisure activities, increased strength and decreased edema  Patient's goals regarding treatment: Able to walk roughly 2 miles for exercise, return to yoga and return to Pilates.  Decreased ankle stiffness and toe stiffness.       *LEFS:  25/80    Objective          Active Range of Motion     Right Ankle/Foot   Dorsiflexion (ke): 10 degrees   Plantar flexion: 52 degrees   Inversion: 38 degrees   Eversion: 10 degrees   Great toe flexion: 28 degrees   Great toe extension: 10 degrees         Assessment & Plan       Assessment  Impairments: abnormal or  restricted ROM, activity intolerance, impaired balance, impaired physical strength, lacks appropriate home exercise program, pain with function and weight-bearing intolerance   Functional limitations: sleeping, walking, standing and unable to perform repetitive tasks   Assessment details: Mrs. Lane is a pleasant 58-year-old female that presents to physical therapy 6 weeks status post right Lapiplasty on November 9, 2023.  Dorsal surgical incision has a 1 mm longitudinal opening where scab sloughed off.  No signs or symptoms of infection.  Ankle mobility is excellent in all planes.  Right first MTP extension and flexion active range of motion is limited.  Has greater passive first MTP extension and flexion mobility.  Will focus care on restoring right first MTP mobility followed by graded loading program.  Prognosis: good    Goals  Plan Goals: Short-term goals:  1.)  LEFS improved by 1 and MCID in 6 weeks.  2.)  Have at least 45 degrees of active right first MTP extension mobility in 6 weeks.  Long-term goals:  1.)  Have 5 out of 5 right first MTP extension and flexion strength in 8 weeks.  2.)  Return to walking at least 2 miles per day without pain in 12 weeks.    Plan  Therapy options: will be seen for skilled therapy services  Planned modality interventions: thermotherapy (hydrocollator packs) and cryotherapy  Planned therapy interventions: therapeutic activities, stretching, strengthening, manual therapy, balance/weight-bearing training, functional ROM exercises, gait training and home exercise program  Frequency: 3x week  Duration in visits: 4  Duration in weeks: 12  Treatment plan discussed with: patient        Manual Therapy:    10     mins  71775;  Therapeutic Exercise:    14     mins  17640;     Neuromuscular Kimberly:        mins  12847;    Therapeutic Activity:          mins  46777;     Gait Training:           mins  84864;     Ultrasound:          mins  38942;    Electrical Stimulation:         mins   05870 ( );  Dry Needling          mins self-pay    Timed Treatment:   24   mins   Total Treatment:     55   mins    PT SIGNATURE: Nilson Goodrich, PT   DATE TREATMENT INITIATED: 12/21/2023    Initial Certification  Certification Period: 3/20/2024  I certify that the therapy services are furnished while this patient is under my care.  The services outlined above are required by this patient, and will be reviewed every 90 days.     PHYSICIAN: Arcadio Galdamez Jr., MD  NPI: [id number]                                      DATE:    Please sign and return via fax to [phone].. Thank you, [facility].

## 2023-12-28 ENCOUNTER — TREATMENT (OUTPATIENT)
Dept: PHYSICAL THERAPY | Facility: CLINIC | Age: 58
End: 2023-12-28
Payer: COMMERCIAL

## 2023-12-28 DIAGNOSIS — M79.674 PAIN OF RIGHT GREAT TOE: Primary | ICD-10-CM

## 2023-12-28 NOTE — PROGRESS NOTES
Physical Therapy Daily Progress Note    Patient: Echo Lane   : 1965  Diagnosis/ICD-10 Code:  Pain of right great toe [M79.674]  Referring practitioner: Arcadio Galdamez Jr., MD  Date of Initial Visit: Type: THERAPY  Noted: 2023  Today's Date: 2023  Patient seen for 2 sessions         Echo Lane reports that her foot feels better since last visit.  Incision is closing on top of foot.        Subjective     Objective   See Exercise, Manual, and Modality Logs for complete treatment.       Assessment/Plan  Has improved right first MTP extension and flexion mobility.  Right ankle mobility is within normal limits in all planes.  Dorsal surgical incision is approximated and healing well.  Has some mild edema about the dorsal aspect of the foot.  Added great toe loading against resistance into flexion extension today without pain.  Will follow-up next week.           Manual Therapy:    12     mins  82831;  Therapeutic Exercise:    15     mins  35154;     Neuromuscular Kimberly:        mins  35773;    Therapeutic Activity:     11     mins  64440;     Gait Training:           mins  20170;     Ultrasound:          mins  86629;    Electrical Stimulation:         mins  09405 ( );  Dry Needling          mins self-pay    Timed Treatment:   38   mins   Total Treatment:     38   mins    Nilson Goodrich PT  Physical Therapist

## 2024-01-02 ENCOUNTER — TREATMENT (OUTPATIENT)
Dept: PHYSICAL THERAPY | Facility: CLINIC | Age: 59
End: 2024-01-02
Payer: COMMERCIAL

## 2024-01-02 DIAGNOSIS — M79.674 PAIN OF RIGHT GREAT TOE: Primary | ICD-10-CM

## 2024-01-02 NOTE — PROGRESS NOTES
Physical Therapy Daily Progress Note    Patient: Echo Lane   : 1965  Diagnosis/ICD-10 Code:  Pain of right great toe [M79.674]  Referring practitioner: Arcadio Galdamez Jr., MD  Date of Initial Visit: Type: THERAPY  Noted: 2023  Today's Date: 2024  Patient seen for 3 sessions         Echo Lane reports having increased right foot pain and swelling yesterday.  Correlates it to eating more sugar including the night before.  Took a naproxen without much relief.  Does feel better this morning.      Subjective     Objective   See Exercise, Manual, and Modality Logs for complete treatment.       Assessment/Plan  Increased edema noted throughout the dorsum of the right foot.  Has less passive and active mobility of the first MTP into flexion extension.  Added weightbearing exercise today.  Had no pain during weightbearing exercise today.  Will follow-up later this week.         Manual Therapy:    15     mins  83540;  Therapeutic Exercise:    15     mins  55183;     Neuromuscular Kimberly:        mins  32717;    Therapeutic Activity:    8     mins  64027;     Gait Training:           mins  93370;     Ultrasound:          mins  59120;    Electrical Stimulation:         mins  74088 ( );  Dry Needling          mins self-pay    Timed Treatment:   38   mins   Total Treatment:     38   mins    Nilson Goodrich PT  Physical Therapist

## 2024-01-04 ENCOUNTER — TREATMENT (OUTPATIENT)
Dept: PHYSICAL THERAPY | Facility: CLINIC | Age: 59
End: 2024-01-04
Payer: COMMERCIAL

## 2024-01-04 DIAGNOSIS — M79.674 PAIN OF RIGHT GREAT TOE: Primary | ICD-10-CM

## 2024-01-04 NOTE — PROGRESS NOTES
Physical Therapy Daily Progress Note    Patient: Echo Lane   : 1965  Diagnosis/ICD-10 Code:  Pain of right great toe [M79.674]  Referring practitioner: Arcadio Galdamez Jr., MD  Date of Initial Visit: Type: THERAPY  Noted: 2023  Today's Date: 2024  Patient seen for 4 sessions         Echo Lane reports feeling better with disorientation.  Notices less swelling since last visit.  Walked her dog about a quarter a mile this morning without any problems.      Subjective     Objective   See Exercise, Manual, and Modality Logs for complete treatment.       Assessment/Plan  Has less dorsal foot edema today.  Surgical incision continues approximated with some remaining scabs.  First MTP mobility is improving and flexion extension actively.  Passive range of motion is greater than active range of motion at this point.  Continue to work on general balance loading tolerance and power output of the ankle and foot musculature.  Will follow-up tomorrow.           Manual Therapy:    15     mins  25053;  Therapeutic Exercise:         mins  93316;     Neuromuscular Kimberly:   15     mins  21046;    Therapeutic Activity:     10     mins  54256;     Gait Training:           mins  93707;     Ultrasound:          mins  92143;    Electrical Stimulation:         mins  76799 ( );  Dry Needling          mins self-pay    Timed Treatment:   40   mins   Total Treatment:     40   mins    Nilson Goodrich PT  Physical Therapist

## 2024-01-05 ENCOUNTER — TREATMENT (OUTPATIENT)
Dept: PHYSICAL THERAPY | Facility: CLINIC | Age: 59
End: 2024-01-05
Payer: COMMERCIAL

## 2024-01-05 DIAGNOSIS — M79.674 PAIN OF RIGHT GREAT TOE: Primary | ICD-10-CM

## 2024-01-05 NOTE — PROGRESS NOTES
Physical Therapy Daily Progress Note    Patient: Echo Lane   : 1965  Diagnosis/ICD-10 Code:  Pain of right great toe [M79.674]  Referring practitioner: Arcadio Galdamez Jr., MD  Date of Initial Visit: Type: THERAPY  Noted: 2023  Today's Date: 2024  Patient seen for 5 sessions         Echo Lane reports feeling better since last visit.  Continues to notice less swelling.  Feels good walking with her shoe now.      Subjective     Objective   See Exercise, Manual, and Modality Logs for complete treatment.       Assessment/Plan  Continued emphasis on improving right first MTP extension/flexion mobility via manual therapy and exercise.  Combined with improving dynamic control of the foot and ankle complex and general loading tolerance of the first MTP region.  Will follow-up 3 times next week.         Manual Therapy:    12     mins  77338;  Therapeutic Exercise:    18     mins  05288;     Neuromuscular Kimberly:        mins  93436;    Therapeutic Activity:     9    mins  16141;     Gait Training:           mins  93204;     Ultrasound:          mins  97877;    Electrical Stimulation:         mins  84296 ( );  Dry Needling          mins self-pay    Timed Treatment:   39   mins   Total Treatment:     39   mins    Nilson Goodrich PT  Physical Therapist

## 2024-01-08 ENCOUNTER — TREATMENT (OUTPATIENT)
Dept: PHYSICAL THERAPY | Facility: CLINIC | Age: 59
End: 2024-01-08
Payer: COMMERCIAL

## 2024-01-08 DIAGNOSIS — M79.674 PAIN OF RIGHT GREAT TOE: Primary | ICD-10-CM

## 2024-01-08 NOTE — PROGRESS NOTES
Physical Therapy Daily Progress Note    Patient: Echo Lane   : 1965  Diagnosis/ICD-10 Code:  Pain of right great toe [M79.674]  Referring practitioner: Arcadio Galdamez Jr., MD  Date of Initial Visit: Type: THERAPY  Noted: 2023  Today's Date: 2024  Patient seen for 6 sessions         Echo Lane reports feeling better since last visit.  Did a lot of the weekend with her grandkids, thus has a little more swelling today.  But, overall feels quite good.  Notices that her motion of the great toe is improving.      Subjective     Objective   See Exercise, Manual, and Modality Logs for complete treatment.       Assessment/Plan  Continued emphasis on improving first MTP mobility in the sagittal plane.  Surgical incision continues to show no signs of infection and is 95% approximated with some mild scabbing showing.  Continued emphasis on improving loading tolerance of the right foot and great toe region without pain.  Will follow-up on Wednesday.           Manual Therapy:    15     mins  55623;  Therapeutic Exercise:    15     mins  04026;     Neuromuscular Kimberly:    15    mins  28745;    Therapeutic Activity:     14     mins  02537;     Gait Training:           mins  07918;     Ultrasound:          mins  40158;    Electrical Stimulation:         mins  66363 ( );  Dry Needling          mins self-pay    Timed Treatment:   54   mins   Total Treatment:     54   mins    Nilson Goodrich PT  Physical Therapist

## 2024-01-10 ENCOUNTER — TREATMENT (OUTPATIENT)
Dept: PHYSICAL THERAPY | Facility: CLINIC | Age: 59
End: 2024-01-10
Payer: COMMERCIAL

## 2024-01-10 DIAGNOSIS — M79.674 PAIN OF RIGHT GREAT TOE: Primary | ICD-10-CM

## 2024-01-10 NOTE — PROGRESS NOTES
Physical Therapy Daily Progress Note    Patient: Echo Lane   : 1965  Diagnosis/ICD-10 Code:  Pain of right great toe [M79.674]  Referring practitioner: No ref. provider found  Date of Initial Visit: Type: THERAPY  Noted: 2023  Today's Date: 1/10/2024  Patient seen for 7 sessions         Echo Lane reports no soreness or swelling since last visit.      Subjective     Objective   See Exercise, Manual, and Modality Logs for complete treatment.       Assessment/Plan  Continued emphasis on restoring right first MTP and IP mobility.  Does have some hypomobility in the right first IP joint into flexion.  Focus visit on improving loading tolerance of the right first MTP region, general balance and proximal hip strength today.  Will follow-up later this week.           Manual Therapy:    15     mins  27652;  Therapeutic Exercise:    15     mins  22347;     Neuromuscular Kimberly:        mins  80319;    Therapeutic Activity:     10     mins  29425;     Gait Training:           mins  70627;     Ultrasound:          mins  39283;    Electrical Stimulation:         mins  99219 ( );  Dry Needling          mins self-pay    Timed Treatment:   40   mins   Total Treatment:     40   mins    Nilson Goodrich PT  Physical Therapist

## 2024-01-12 ENCOUNTER — TREATMENT (OUTPATIENT)
Dept: PHYSICAL THERAPY | Facility: CLINIC | Age: 59
End: 2024-01-12
Payer: COMMERCIAL

## 2024-01-12 DIAGNOSIS — M79.674 PAIN OF RIGHT GREAT TOE: Primary | ICD-10-CM

## 2024-01-12 NOTE — PROGRESS NOTES
Physical Therapy Daily Progress Note    Patient: Echo Lane   : 1965  Diagnosis/ICD-10 Code:  Pain of right great toe [M79.674]  Referring practitioner: No ref. provider found  Date of Initial Visit: Type: THERAPY  Noted: 2023  Today's Date: 2024  Patient seen for 8 sessions         Echo Lane reports no soreness or swelling since last visit.  Feels good overall.  Follows up with orthopedic surgeon next week.      Subjective     Objective   See Exercise, Manual, and Modality Logs for complete treatment.       Assessment/Plan  Has improved first digit IP flexion active and passive mobility today.  Continued emphasis on improving first MTP region loading and weightbearing.  Combined with improving dynamic balance and general right lower quarter muscle strength.  Will follow-up 3 times next week.         Manual Therapy:    15     mins  58159;  Therapeutic Exercise:    15     mins  96792;     Neuromuscular Kimberly:    12    mins  82514;    Therapeutic Activity:     12     mins  78030;     Gait Training:           mins  81330;     Ultrasound:          mins  25610;    Electrical Stimulation:         mins  51805 ( );  Dry Needling          mins self-pay    Timed Treatment:   54   mins   Total Treatment:     54   mins    Nilson Goodrich, MAURICE  Physical Therapist

## 2024-01-15 ENCOUNTER — TREATMENT (OUTPATIENT)
Dept: PHYSICAL THERAPY | Facility: CLINIC | Age: 59
End: 2024-01-15
Payer: COMMERCIAL

## 2024-01-15 DIAGNOSIS — M79.674 PAIN OF RIGHT GREAT TOE: Primary | ICD-10-CM

## 2024-01-15 NOTE — PROGRESS NOTES
Physical Therapy Daily Progress Note    Patient: Echo Lane   : 1965  Diagnosis/ICD-10 Code:  Pain of right great toe [M79.674]  Referring practitioner: No ref. provider found  Date of Initial Visit: Type: THERAPY  Noted: 2023  Today's Date: 1/15/2024  Patient seen for 9 sessions         Echo Lane reports having no soreness after last visit.  Followed up with her orthopedic surgeon this morning and shows signs of healing on x-ray.  Was cleared to leave for [place] this week.      Subjective     Objective   See Exercise, Manual, and Modality Logs for complete treatment.       Assessment/Plan  Continued emphasis on improving right first MTP and IP mobility via manual therapy and exercise.  Combined with improving loadbearing tolerance of the medial right foot, general balance and foot muscle endurance.  Will follow-up tomorrow.           Manual Therapy:    15     mins  18914;  Therapeutic Exercise:    18     mins  49030;     Neuromuscular Kimberly:    9    mins  01924;    Therapeutic Activity:     11     mins  80942;     Gait Training:           mins  11885;     Ultrasound:          mins  39491;    Electrical Stimulation:         mins  42224 ( );  Dry Needling          mins self-pay    Timed Treatment:   53   mins   Total Treatment:     53   mins    Nilson Goodrich, PT  Physical Therapist

## 2024-01-16 ENCOUNTER — TREATMENT (OUTPATIENT)
Dept: PHYSICAL THERAPY | Facility: CLINIC | Age: 59
End: 2024-01-16
Payer: COMMERCIAL

## 2024-01-16 DIAGNOSIS — M79.674 PAIN OF RIGHT GREAT TOE: Primary | ICD-10-CM

## 2024-01-16 NOTE — PROGRESS NOTES
Physical Therapy Daily Progress Note    Patient: Echo Lane   : 1965  Diagnosis/ICD-10 Code:  Pain of right great toe [M79.674]  Referring practitioner: No ref. provider found  Date of Initial Visit: Type: THERAPY  Noted: 2023  Today's Date: 2024  Patient seen for 10 sessions         Echo Lane reports feeling good since yesterday.  Notes that she is going to be leaving for [place] tomorrow.  Will be back in 2 months.      Subjective     Objective   See Exercise, Manual, and Modality Logs for complete treatment.       Assessment/Plan  Continued emphasis on improving right foot mobility via manual therapy.  Combined with loading exercises to improve first MTP strength, general balance and higher level loading exercises.  Will hold on physical therapy is Mrs. Lane will be in [place] for the next 2 months.  Will provide her a robust exercise program to perform while she is gone.  Will be happy to see her when she returns.           Manual Therapy:    9     mins  37172;  Therapeutic Exercise:    15     mins  45404;     Neuromuscular Kimberly:    15    mins  92316;    Therapeutic Activity:          mins  64011;     Gait Training:           mins  67436;     Ultrasound:          mins  65978;    Electrical Stimulation:         mins  22534 ( );  Dry Needling          mins self-pay    Timed Treatment:   39   mins   Total Treatment:     39   mins    Nilson Goodrich PT  Physical Therapist

## 2024-04-30 ENCOUNTER — TRANSCRIBE ORDERS (OUTPATIENT)
Dept: LAB | Facility: HOSPITAL | Age: 59
End: 2024-04-30
Payer: COMMERCIAL

## 2024-04-30 ENCOUNTER — LAB (OUTPATIENT)
Dept: LAB | Facility: HOSPITAL | Age: 59
End: 2024-04-30
Payer: COMMERCIAL

## 2024-04-30 DIAGNOSIS — F52.0 HYPOACTIVE SEXUAL DESIRE DISORDER: ICD-10-CM

## 2024-04-30 DIAGNOSIS — F52.0 HYPOACTIVE SEXUAL DESIRE DISORDER: Primary | ICD-10-CM

## 2024-04-30 LAB — ESTRADIOL SERPL HS-MCNC: 35.4 PG/ML

## 2024-04-30 PROCEDURE — 82670 ASSAY OF TOTAL ESTRADIOL: CPT

## 2024-04-30 PROCEDURE — 36415 COLL VENOUS BLD VENIPUNCTURE: CPT

## 2024-04-30 PROCEDURE — 84403 ASSAY OF TOTAL TESTOSTERONE: CPT

## 2024-04-30 PROCEDURE — 84270 ASSAY OF SEX HORMONE GLOBUL: CPT

## 2024-04-30 PROCEDURE — 82627 DEHYDROEPIANDROSTERONE: CPT

## 2024-04-30 PROCEDURE — 83498 ASY HYDROXYPROGESTERONE 17-D: CPT

## 2024-04-30 PROCEDURE — 84402 ASSAY OF FREE TESTOSTERONE: CPT

## 2024-05-01 LAB
DHEA-S SERPL-MCNC: 117 UG/DL (ref 29.4–220.5)
SHBG SERPL-SCNC: 50.9 NMOL/L (ref 17.3–125)

## 2024-05-05 LAB — 17OHP SERPL-MCNC: <10 NG/DL

## 2024-05-07 LAB
TESTOST FREE SERPL-MCNC: 0.5 PG/ML (ref 0–4.2)
TESTOST SERPL-MCNC: <3 NG/DL (ref 4–50)

## 2024-05-22 ENCOUNTER — TRANSCRIBE ORDERS (OUTPATIENT)
Dept: ADMINISTRATIVE | Facility: HOSPITAL | Age: 59
End: 2024-05-22
Payer: COMMERCIAL

## 2024-05-22 DIAGNOSIS — Z12.31 VISIT FOR SCREENING MAMMOGRAM: Primary | ICD-10-CM

## 2024-06-06 ENCOUNTER — HOSPITAL ENCOUNTER (OUTPATIENT)
Dept: MAMMOGRAPHY | Facility: HOSPITAL | Age: 59
Discharge: HOME | End: 2024-06-06
Admitting: NURSE PRACTITIONER
Payer: COMMERCIAL

## 2024-06-06 DIAGNOSIS — Z12.31 VISIT FOR SCREENING MAMMOGRAM: ICD-10-CM

## 2024-06-06 PROCEDURE — 77067 SCR MAMMO BI INCL CAD: CPT

## 2024-06-06 PROCEDURE — 77063 BREAST TOMOSYNTHESIS BI: CPT

## 2024-08-30 ENCOUNTER — TRANSCRIBE ORDERS (OUTPATIENT)
Dept: LAB | Facility: HOSPITAL | Age: 59
End: 2024-08-30
Payer: COMMERCIAL

## 2024-08-30 ENCOUNTER — LAB (OUTPATIENT)
Dept: LAB | Facility: HOSPITAL | Age: 59
End: 2024-08-30
Payer: COMMERCIAL

## 2024-08-30 DIAGNOSIS — F52.0 HYPOACTIVE SEXUAL DESIRE DISORDER: ICD-10-CM

## 2024-08-30 DIAGNOSIS — F52.0 HYPOACTIVE SEXUAL DESIRE DISORDER: Primary | ICD-10-CM

## 2024-08-30 DIAGNOSIS — Z79.890 NEED FOR PROPHYLACTIC HORMONE REPLACEMENT THERAPY (POSTMENOPAUSAL): ICD-10-CM

## 2024-08-30 LAB — ESTRADIOL SERPL HS-MCNC: 56.6 PG/ML

## 2024-08-30 PROCEDURE — 82627 DEHYDROEPIANDROSTERONE: CPT

## 2024-08-30 PROCEDURE — 84270 ASSAY OF SEX HORMONE GLOBUL: CPT

## 2024-08-30 PROCEDURE — 84402 ASSAY OF FREE TESTOSTERONE: CPT

## 2024-08-30 PROCEDURE — 36415 COLL VENOUS BLD VENIPUNCTURE: CPT

## 2024-08-30 PROCEDURE — 82670 ASSAY OF TOTAL ESTRADIOL: CPT

## 2024-08-30 PROCEDURE — 83498 ASY HYDROXYPROGESTERONE 17-D: CPT

## 2024-08-30 PROCEDURE — 84403 ASSAY OF TOTAL TESTOSTERONE: CPT

## 2024-08-31 LAB
DHEA-S SERPL-MCNC: 122 UG/DL (ref 29.4–220.5)
SHBG SERPL-SCNC: 46.4 NMOL/L (ref 17.3–125)

## 2024-09-03 LAB — 17OHP SERPL-MCNC: 31 NG/DL

## 2024-09-04 LAB
TESTOST FREE SERPL-MCNC: 0.5 PG/ML (ref 0–4.2)
TESTOST SERPL-MCNC: 6 NG/DL (ref 4–50)

## 2024-09-24 ENCOUNTER — LAB (OUTPATIENT)
Dept: LAB | Facility: HOSPITAL | Age: 59
End: 2024-09-24
Payer: COMMERCIAL

## 2024-09-24 ENCOUNTER — TRANSCRIBE ORDERS (OUTPATIENT)
Dept: LAB | Facility: HOSPITAL | Age: 59
End: 2024-09-24
Payer: COMMERCIAL

## 2024-09-24 DIAGNOSIS — B35.1 DERMATOPHYTOSIS OF NAIL: Primary | ICD-10-CM

## 2024-09-24 DIAGNOSIS — B35.1 DERMATOPHYTOSIS OF NAIL: ICD-10-CM

## 2024-09-24 LAB
ALBUMIN SERPL-MCNC: 4.8 G/DL (ref 3.5–5.2)
ALP SERPL-CCNC: 70 U/L (ref 39–117)
ALT SERPL W P-5'-P-CCNC: 31 U/L (ref 1–33)
AST SERPL-CCNC: 26 U/L (ref 1–32)
BILIRUB CONJ SERPL-MCNC: <0.2 MG/DL (ref 0–0.3)
BILIRUB INDIRECT SERPL-MCNC: NORMAL MG/DL
BILIRUB SERPL-MCNC: 0.5 MG/DL (ref 0–1.2)
PROT SERPL-MCNC: 7 G/DL (ref 6–8.5)

## 2024-09-24 PROCEDURE — 80076 HEPATIC FUNCTION PANEL: CPT

## 2024-09-24 PROCEDURE — 36415 COLL VENOUS BLD VENIPUNCTURE: CPT

## 2025-01-02 ENCOUNTER — LAB (OUTPATIENT)
Dept: LAB | Facility: HOSPITAL | Age: 60
End: 2025-01-02
Payer: COMMERCIAL

## 2025-01-02 ENCOUNTER — TRANSCRIBE ORDERS (OUTPATIENT)
Dept: LAB | Facility: HOSPITAL | Age: 60
End: 2025-01-02
Payer: COMMERCIAL

## 2025-01-02 DIAGNOSIS — F52.0 HYPOACTIVE SEXUAL DESIRE DISORDER: ICD-10-CM

## 2025-01-02 DIAGNOSIS — F52.0 HYPOACTIVE SEXUAL DESIRE DISORDER: Primary | ICD-10-CM

## 2025-01-02 PROCEDURE — 84402 ASSAY OF FREE TESTOSTERONE: CPT

## 2025-01-02 PROCEDURE — 84403 ASSAY OF TOTAL TESTOSTERONE: CPT

## 2025-01-02 PROCEDURE — 36415 COLL VENOUS BLD VENIPUNCTURE: CPT

## 2025-01-05 LAB
TESTOST FREE SERPL-MCNC: 1.2 PG/ML (ref 0–4.2)
TESTOST SERPL-MCNC: 19 NG/DL (ref 3–67)

## 2025-03-28 ENCOUNTER — TRANSCRIBE ORDERS (OUTPATIENT)
Dept: LAB | Facility: HOSPITAL | Age: 60
End: 2025-03-28
Payer: COMMERCIAL

## 2025-03-28 ENCOUNTER — LAB (OUTPATIENT)
Dept: LAB | Facility: HOSPITAL | Age: 60
End: 2025-03-28
Payer: COMMERCIAL

## 2025-03-28 DIAGNOSIS — F52.0 HYPOACTIVE SEXUAL DESIRE DISORDER: Primary | ICD-10-CM

## 2025-03-28 DIAGNOSIS — F52.0 HYPOACTIVE SEXUAL DESIRE DISORDER: ICD-10-CM

## 2025-03-28 PROCEDURE — 36415 COLL VENOUS BLD VENIPUNCTURE: CPT

## 2025-03-28 PROCEDURE — 84402 ASSAY OF FREE TESTOSTERONE: CPT

## 2025-03-28 PROCEDURE — 84403 ASSAY OF TOTAL TESTOSTERONE: CPT

## 2025-04-01 LAB
TESTOST FREE SERPL-MCNC: 1.7 PG/ML (ref 0–4.2)
TESTOST SERPL-MCNC: 25 NG/DL (ref 3–67)

## 2025-05-30 ENCOUNTER — TRANSCRIBE ORDERS (OUTPATIENT)
Dept: ADMINISTRATIVE | Facility: HOSPITAL | Age: 60
End: 2025-05-30
Payer: COMMERCIAL

## 2025-05-30 DIAGNOSIS — Z12.31 VISIT FOR SCREENING MAMMOGRAM: Primary | ICD-10-CM

## 2025-07-07 ENCOUNTER — TRANSCRIBE ORDERS (OUTPATIENT)
Dept: LAB | Facility: HOSPITAL | Age: 60
End: 2025-07-07
Payer: COMMERCIAL

## 2025-07-07 ENCOUNTER — LAB (OUTPATIENT)
Dept: LAB | Facility: HOSPITAL | Age: 60
End: 2025-07-07
Payer: COMMERCIAL

## 2025-07-07 DIAGNOSIS — F52.0 HYPOACTIVE SEXUAL DESIRE DISORDER: ICD-10-CM

## 2025-07-07 DIAGNOSIS — Z79.890 NEED FOR PROPHYLACTIC HORMONE REPLACEMENT THERAPY (POSTMENOPAUSAL): ICD-10-CM

## 2025-07-07 DIAGNOSIS — R79.89 HYPOURICEMIA: ICD-10-CM

## 2025-07-07 DIAGNOSIS — F52.0 HYPOACTIVE SEXUAL DESIRE DISORDER: Primary | ICD-10-CM

## 2025-07-07 PROCEDURE — 84402 ASSAY OF FREE TESTOSTERONE: CPT

## 2025-07-07 PROCEDURE — 84403 ASSAY OF TOTAL TESTOSTERONE: CPT

## 2025-07-07 PROCEDURE — 36415 COLL VENOUS BLD VENIPUNCTURE: CPT

## 2025-07-10 LAB
TESTOST FREE SERPL-MCNC: 2.4 PG/ML (ref 0–4.2)
TESTOST SERPL-MCNC: 181 NG/DL (ref 3–67)

## 2025-08-21 LAB
NCCN CRITERIA FLAG: NORMAL
TYRER CUZICK SCORE: 6.1

## 2025-08-22 ENCOUNTER — HOSPITAL ENCOUNTER (OUTPATIENT)
Dept: MAMMOGRAPHY | Facility: HOSPITAL | Age: 60
Discharge: HOME | End: 2025-08-22
Admitting: NURSE PRACTITIONER
Payer: COMMERCIAL

## 2025-08-22 DIAGNOSIS — Z12.31 VISIT FOR SCREENING MAMMOGRAM: ICD-10-CM

## 2025-08-22 PROCEDURE — 77067 SCR MAMMO BI INCL CAD: CPT

## 2025-08-22 PROCEDURE — 77063 BREAST TOMOSYNTHESIS BI: CPT
